# Patient Record
Sex: FEMALE | Race: BLACK OR AFRICAN AMERICAN | NOT HISPANIC OR LATINO
[De-identification: names, ages, dates, MRNs, and addresses within clinical notes are randomized per-mention and may not be internally consistent; named-entity substitution may affect disease eponyms.]

---

## 2020-10-01 ENCOUNTER — APPOINTMENT (OUTPATIENT)
Dept: PLASTIC SURGERY | Facility: CLINIC | Age: 20
End: 2020-10-01
Payer: COMMERCIAL

## 2020-10-01 VITALS — BODY MASS INDEX: 22.68 KG/M2 | WEIGHT: 128 LBS | HEIGHT: 63 IN

## 2020-10-01 DIAGNOSIS — L91.0 HYPERTROPHIC SCAR: ICD-10-CM

## 2020-10-01 PROBLEM — Z00.00 ENCOUNTER FOR PREVENTIVE HEALTH EXAMINATION: Status: ACTIVE | Noted: 2020-10-01

## 2020-10-01 PROCEDURE — 99203 OFFICE O/P NEW LOW 30 MIN: CPT

## 2020-10-08 PROBLEM — L91.0 KELOID: Status: ACTIVE | Noted: 2020-10-08

## 2020-10-08 NOTE — HISTORY OF PRESENT ILLNESS
[FreeTextEntry1] : Patient presents to the office today for Left superior helix keloid. Patient states she got her ear pierced two years ago. About a few weeks later, keloid began to form. Patient here to discuss removal.\par occ itchy\par no relevant pmg/psh\par no fh keloids\par

## 2020-10-20 ENCOUNTER — APPOINTMENT (OUTPATIENT)
Dept: PLASTIC SURGERY | Facility: CLINIC | Age: 20
End: 2020-10-20

## 2023-12-17 ENCOUNTER — INPATIENT (INPATIENT)
Facility: HOSPITAL | Age: 23
LOS: 10 days | Discharge: HOME | End: 2023-12-28
Attending: PSYCHIATRY & NEUROLOGY | Admitting: PSYCHIATRY & NEUROLOGY
Payer: COMMERCIAL

## 2023-12-17 VITALS
RESPIRATION RATE: 16 BRPM | HEART RATE: 135 BPM | DIASTOLIC BLOOD PRESSURE: 103 MMHG | OXYGEN SATURATION: 100 % | TEMPERATURE: 99 F | SYSTOLIC BLOOD PRESSURE: 149 MMHG

## 2023-12-17 DIAGNOSIS — F30.9 MANIC EPISODE, UNSPECIFIED: ICD-10-CM

## 2023-12-17 LAB
ALBUMIN SERPL ELPH-MCNC: 4.3 G/DL — SIGNIFICANT CHANGE UP (ref 3.3–5)
ALBUMIN SERPL ELPH-MCNC: 4.3 G/DL — SIGNIFICANT CHANGE UP (ref 3.3–5)
ALP SERPL-CCNC: 56 U/L — SIGNIFICANT CHANGE UP (ref 40–120)
ALP SERPL-CCNC: 56 U/L — SIGNIFICANT CHANGE UP (ref 40–120)
ALT FLD-CCNC: 14 U/L — SIGNIFICANT CHANGE UP (ref 4–33)
ALT FLD-CCNC: 14 U/L — SIGNIFICANT CHANGE UP (ref 4–33)
ANION GAP SERPL CALC-SCNC: 18 MMOL/L — HIGH (ref 7–14)
ANION GAP SERPL CALC-SCNC: 18 MMOL/L — HIGH (ref 7–14)
APAP SERPL-MCNC: <10 UG/ML — LOW (ref 15–25)
APAP SERPL-MCNC: <10 UG/ML — LOW (ref 15–25)
AST SERPL-CCNC: 31 U/L — SIGNIFICANT CHANGE UP (ref 4–32)
AST SERPL-CCNC: 31 U/L — SIGNIFICANT CHANGE UP (ref 4–32)
BASOPHILS # BLD AUTO: 0.05 K/UL — SIGNIFICANT CHANGE UP (ref 0–0.2)
BASOPHILS # BLD AUTO: 0.05 K/UL — SIGNIFICANT CHANGE UP (ref 0–0.2)
BASOPHILS NFR BLD AUTO: 1 % — SIGNIFICANT CHANGE UP (ref 0–2)
BASOPHILS NFR BLD AUTO: 1 % — SIGNIFICANT CHANGE UP (ref 0–2)
BILIRUB SERPL-MCNC: 0.4 MG/DL — SIGNIFICANT CHANGE UP (ref 0.2–1.2)
BILIRUB SERPL-MCNC: 0.4 MG/DL — SIGNIFICANT CHANGE UP (ref 0.2–1.2)
BUN SERPL-MCNC: 14 MG/DL — SIGNIFICANT CHANGE UP (ref 7–23)
BUN SERPL-MCNC: 14 MG/DL — SIGNIFICANT CHANGE UP (ref 7–23)
CALCIUM SERPL-MCNC: 9.3 MG/DL — SIGNIFICANT CHANGE UP (ref 8.4–10.5)
CALCIUM SERPL-MCNC: 9.3 MG/DL — SIGNIFICANT CHANGE UP (ref 8.4–10.5)
CHLORIDE SERPL-SCNC: 103 MMOL/L — SIGNIFICANT CHANGE UP (ref 98–107)
CHLORIDE SERPL-SCNC: 103 MMOL/L — SIGNIFICANT CHANGE UP (ref 98–107)
CO2 SERPL-SCNC: 18 MMOL/L — LOW (ref 22–31)
CO2 SERPL-SCNC: 18 MMOL/L — LOW (ref 22–31)
CREAT SERPL-MCNC: 0.69 MG/DL — SIGNIFICANT CHANGE UP (ref 0.5–1.3)
CREAT SERPL-MCNC: 0.69 MG/DL — SIGNIFICANT CHANGE UP (ref 0.5–1.3)
EGFR: 125 ML/MIN/1.73M2 — SIGNIFICANT CHANGE UP
EGFR: 125 ML/MIN/1.73M2 — SIGNIFICANT CHANGE UP
EOSINOPHIL # BLD AUTO: 0.04 K/UL — SIGNIFICANT CHANGE UP (ref 0–0.5)
EOSINOPHIL # BLD AUTO: 0.04 K/UL — SIGNIFICANT CHANGE UP (ref 0–0.5)
EOSINOPHIL NFR BLD AUTO: 0.8 % — SIGNIFICANT CHANGE UP (ref 0–6)
EOSINOPHIL NFR BLD AUTO: 0.8 % — SIGNIFICANT CHANGE UP (ref 0–6)
ETHANOL SERPL-MCNC: <10 MG/DL — SIGNIFICANT CHANGE UP
ETHANOL SERPL-MCNC: <10 MG/DL — SIGNIFICANT CHANGE UP
FLUAV AG NPH QL: SIGNIFICANT CHANGE UP
FLUAV AG NPH QL: SIGNIFICANT CHANGE UP
FLUBV AG NPH QL: SIGNIFICANT CHANGE UP
FLUBV AG NPH QL: SIGNIFICANT CHANGE UP
GLUCOSE SERPL-MCNC: 96 MG/DL — SIGNIFICANT CHANGE UP (ref 70–99)
GLUCOSE SERPL-MCNC: 96 MG/DL — SIGNIFICANT CHANGE UP (ref 70–99)
HCG SERPL-ACNC: <1 MIU/ML — SIGNIFICANT CHANGE UP
HCG SERPL-ACNC: <1 MIU/ML — SIGNIFICANT CHANGE UP
HCT VFR BLD CALC: 27.6 % — LOW (ref 34.5–45)
HCT VFR BLD CALC: 27.6 % — LOW (ref 34.5–45)
HGB BLD-MCNC: 8.3 G/DL — LOW (ref 11.5–15.5)
HGB BLD-MCNC: 8.3 G/DL — LOW (ref 11.5–15.5)
IANC: 3.77 K/UL — SIGNIFICANT CHANGE UP (ref 1.8–7.4)
IANC: 3.77 K/UL — SIGNIFICANT CHANGE UP (ref 1.8–7.4)
IMM GRANULOCYTES NFR BLD AUTO: 0.4 % — SIGNIFICANT CHANGE UP (ref 0–0.9)
IMM GRANULOCYTES NFR BLD AUTO: 0.4 % — SIGNIFICANT CHANGE UP (ref 0–0.9)
LYMPHOCYTES # BLD AUTO: 0.89 K/UL — LOW (ref 1–3.3)
LYMPHOCYTES # BLD AUTO: 0.89 K/UL — LOW (ref 1–3.3)
LYMPHOCYTES # BLD AUTO: 17.4 % — SIGNIFICANT CHANGE UP (ref 13–44)
LYMPHOCYTES # BLD AUTO: 17.4 % — SIGNIFICANT CHANGE UP (ref 13–44)
MCHC RBC-ENTMCNC: 22.6 PG — LOW (ref 27–34)
MCHC RBC-ENTMCNC: 22.6 PG — LOW (ref 27–34)
MCHC RBC-ENTMCNC: 30.1 GM/DL — LOW (ref 32–36)
MCHC RBC-ENTMCNC: 30.1 GM/DL — LOW (ref 32–36)
MCV RBC AUTO: 75.2 FL — LOW (ref 80–100)
MCV RBC AUTO: 75.2 FL — LOW (ref 80–100)
MONOCYTES # BLD AUTO: 0.34 K/UL — SIGNIFICANT CHANGE UP (ref 0–0.9)
MONOCYTES # BLD AUTO: 0.34 K/UL — SIGNIFICANT CHANGE UP (ref 0–0.9)
MONOCYTES NFR BLD AUTO: 6.7 % — SIGNIFICANT CHANGE UP (ref 2–14)
MONOCYTES NFR BLD AUTO: 6.7 % — SIGNIFICANT CHANGE UP (ref 2–14)
NEUTROPHILS # BLD AUTO: 3.77 K/UL — SIGNIFICANT CHANGE UP (ref 1.8–7.4)
NEUTROPHILS # BLD AUTO: 3.77 K/UL — SIGNIFICANT CHANGE UP (ref 1.8–7.4)
NEUTROPHILS NFR BLD AUTO: 73.7 % — SIGNIFICANT CHANGE UP (ref 43–77)
NEUTROPHILS NFR BLD AUTO: 73.7 % — SIGNIFICANT CHANGE UP (ref 43–77)
NRBC # BLD: 0 /100 WBCS — SIGNIFICANT CHANGE UP (ref 0–0)
NRBC # BLD: 0 /100 WBCS — SIGNIFICANT CHANGE UP (ref 0–0)
NRBC # FLD: 0 K/UL — SIGNIFICANT CHANGE UP (ref 0–0)
NRBC # FLD: 0 K/UL — SIGNIFICANT CHANGE UP (ref 0–0)
PLATELET # BLD AUTO: 374 K/UL — SIGNIFICANT CHANGE UP (ref 150–400)
PLATELET # BLD AUTO: 374 K/UL — SIGNIFICANT CHANGE UP (ref 150–400)
POTASSIUM SERPL-MCNC: 3.9 MMOL/L — SIGNIFICANT CHANGE UP (ref 3.5–5.3)
POTASSIUM SERPL-MCNC: 3.9 MMOL/L — SIGNIFICANT CHANGE UP (ref 3.5–5.3)
POTASSIUM SERPL-SCNC: 3.9 MMOL/L — SIGNIFICANT CHANGE UP (ref 3.5–5.3)
POTASSIUM SERPL-SCNC: 3.9 MMOL/L — SIGNIFICANT CHANGE UP (ref 3.5–5.3)
PROT SERPL-MCNC: 7.5 G/DL — SIGNIFICANT CHANGE UP (ref 6–8.3)
PROT SERPL-MCNC: 7.5 G/DL — SIGNIFICANT CHANGE UP (ref 6–8.3)
RBC # BLD: 3.67 M/UL — LOW (ref 3.8–5.2)
RBC # BLD: 3.67 M/UL — LOW (ref 3.8–5.2)
RBC # FLD: 18.4 % — HIGH (ref 10.3–14.5)
RBC # FLD: 18.4 % — HIGH (ref 10.3–14.5)
RSV RNA NPH QL NAA+NON-PROBE: SIGNIFICANT CHANGE UP
RSV RNA NPH QL NAA+NON-PROBE: SIGNIFICANT CHANGE UP
SALICYLATES SERPL-MCNC: <0.3 MG/DL — LOW (ref 15–30)
SALICYLATES SERPL-MCNC: <0.3 MG/DL — LOW (ref 15–30)
SARS-COV-2 RNA SPEC QL NAA+PROBE: SIGNIFICANT CHANGE UP
SARS-COV-2 RNA SPEC QL NAA+PROBE: SIGNIFICANT CHANGE UP
SODIUM SERPL-SCNC: 139 MMOL/L — SIGNIFICANT CHANGE UP (ref 135–145)
SODIUM SERPL-SCNC: 139 MMOL/L — SIGNIFICANT CHANGE UP (ref 135–145)
TOXICOLOGY SCREEN, DRUGS OF ABUSE, SERUM RESULT: SIGNIFICANT CHANGE UP
TOXICOLOGY SCREEN, DRUGS OF ABUSE, SERUM RESULT: SIGNIFICANT CHANGE UP
TSH SERPL-MCNC: 1.85 UIU/ML — SIGNIFICANT CHANGE UP (ref 0.27–4.2)
TSH SERPL-MCNC: 1.85 UIU/ML — SIGNIFICANT CHANGE UP (ref 0.27–4.2)
WBC # BLD: 5.11 K/UL — SIGNIFICANT CHANGE UP (ref 3.8–10.5)
WBC # BLD: 5.11 K/UL — SIGNIFICANT CHANGE UP (ref 3.8–10.5)
WBC # FLD AUTO: 5.11 K/UL — SIGNIFICANT CHANGE UP (ref 3.8–10.5)
WBC # FLD AUTO: 5.11 K/UL — SIGNIFICANT CHANGE UP (ref 3.8–10.5)

## 2023-12-17 PROCEDURE — 70450 CT HEAD/BRAIN W/O DYE: CPT | Mod: 26,MA

## 2023-12-17 PROCEDURE — 99285 EMERGENCY DEPT VISIT HI MDM: CPT

## 2023-12-17 RX ORDER — SODIUM CHLORIDE 9 MG/ML
1000 INJECTION INTRAMUSCULAR; INTRAVENOUS; SUBCUTANEOUS ONCE
Refills: 0 | Status: COMPLETED | OUTPATIENT
Start: 2023-12-17 | End: 2023-12-17

## 2023-12-17 RX ORDER — ARIPIPRAZOLE 15 MG/1
5 TABLET ORAL DAILY
Refills: 0 | Status: DISCONTINUED | OUTPATIENT
Start: 2023-12-18 | End: 2023-12-28

## 2023-12-17 RX ORDER — HALOPERIDOL DECANOATE 100 MG/ML
5 INJECTION INTRAMUSCULAR EVERY 6 HOURS
Refills: 0 | Status: DISCONTINUED | OUTPATIENT
Start: 2023-12-18 | End: 2023-12-28

## 2023-12-17 RX ORDER — MIDAZOLAM HYDROCHLORIDE 1 MG/ML
1 INJECTION, SOLUTION INTRAMUSCULAR; INTRAVENOUS ONCE
Refills: 0 | Status: DISCONTINUED | OUTPATIENT
Start: 2023-12-17 | End: 2023-12-17

## 2023-12-17 RX ORDER — DIPHENHYDRAMINE HCL 50 MG
50 CAPSULE ORAL EVERY 6 HOURS
Refills: 0 | Status: DISCONTINUED | OUTPATIENT
Start: 2023-12-18 | End: 2023-12-28

## 2023-12-17 RX ORDER — HALOPERIDOL DECANOATE 100 MG/ML
5 INJECTION INTRAMUSCULAR ONCE
Refills: 0 | Status: COMPLETED | OUTPATIENT
Start: 2023-12-17 | End: 2023-12-17

## 2023-12-17 RX ORDER — HALOPERIDOL DECANOATE 100 MG/ML
2 INJECTION INTRAMUSCULAR ONCE
Refills: 0 | Status: DISCONTINUED | OUTPATIENT
Start: 2023-12-17 | End: 2023-12-17

## 2023-12-17 RX ORDER — FERROUS SULFATE 325(65) MG
325 TABLET ORAL DAILY
Refills: 0 | Status: DISCONTINUED | OUTPATIENT
Start: 2023-12-17 | End: 2023-12-18

## 2023-12-17 RX ORDER — DIPHENHYDRAMINE HCL 50 MG
50 CAPSULE ORAL ONCE
Refills: 0 | Status: DISCONTINUED | OUTPATIENT
Start: 2023-12-18 | End: 2023-12-28

## 2023-12-17 RX ORDER — HALOPERIDOL DECANOATE 100 MG/ML
5 INJECTION INTRAMUSCULAR ONCE
Refills: 0 | Status: DISCONTINUED | OUTPATIENT
Start: 2023-12-18 | End: 2023-12-28

## 2023-12-17 RX ORDER — MIDAZOLAM HYDROCHLORIDE 1 MG/ML
5 INJECTION, SOLUTION INTRAMUSCULAR; INTRAVENOUS ONCE
Refills: 0 | Status: DISCONTINUED | OUTPATIENT
Start: 2023-12-17 | End: 2023-12-17

## 2023-12-17 RX ORDER — ARIPIPRAZOLE 15 MG/1
5 TABLET ORAL ONCE
Refills: 0 | Status: COMPLETED | OUTPATIENT
Start: 2023-12-17 | End: 2023-12-17

## 2023-12-17 RX ADMIN — SODIUM CHLORIDE 1000 MILLILITER(S): 9 INJECTION INTRAMUSCULAR; INTRAVENOUS; SUBCUTANEOUS at 17:37

## 2023-12-17 RX ADMIN — MIDAZOLAM HYDROCHLORIDE 1 MILLIGRAM(S): 1 INJECTION, SOLUTION INTRAMUSCULAR; INTRAVENOUS at 17:40

## 2023-12-17 RX ADMIN — MIDAZOLAM HYDROCHLORIDE 5 MILLIGRAM(S): 1 INJECTION, SOLUTION INTRAMUSCULAR; INTRAVENOUS at 16:36

## 2023-12-17 RX ADMIN — HALOPERIDOL DECANOATE 5 MILLIGRAM(S): 100 INJECTION INTRAMUSCULAR at 16:36

## 2023-12-17 NOTE — ED PROVIDER NOTE - PHYSICAL EXAMINATION
GENERAL: laying with eyes closed, chanting the phrase "I bind and blind all unclean spirits in the name of alphonso. thank you abblauren" on repeat.   HEAD: normocephalic, atraumatic  HEENT: normal conjunctiva, oral mucosa moist,  CARDIAC: tachycardic, 2+ pulses in UE/LE b/l  PULM: normal breath sounds, clear to ascultation bilaterally, no rales, rhonchi, wheezing no resp distress GENERAL: in distress, laying with eyes closed, chanting the phrase "I bind and blind all unclean spirits in the name of alphonso. thank you minora" on repeat.   HEAD: normocephalic, atraumatic  HEENT: normal conjunctiva, oral mucosa moist,  CARDIAC: tachycardic, 2+ pulses in UE/LE b/l  PULM: normal breath sounds, clear to ascultation bilaterally, no rales, rhonchi, wheezing no resp distress  GI: abdomen nondistended, soft, nontender, no guarding, rebound tenderness  NEURO: normal speech, pupils pinpoint but reactive, EOMI, , AAOx1   MSK: pelvis stable, chest wall stable, no evidence of trauma to the extremities, no visible deformities, moves all extremities without issue.   SKIN: well-perfused, extremities warm, no visible ecchymosis or abrasions to her chest, abdomen, and extremities   PSYCH: does not display insight into her current condition and reason for being in the hospital, does not appear to be responding to internal stimuli,

## 2023-12-17 NOTE — ED BEHAVIORAL HEALTH ASSESSMENT NOTE - HPI (INCLUDE ILLNESS QUALITY, SEVERITY, DURATION, TIMING, CONTEXT, MODIFYING FACTORS, ASSOCIATED SIGNS AND SYMPTOMS)
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    Pt seen with boyfriend. Pt is a limited historian. Pt says she is not sure why she is here, "ask my aunt." Pt makes comments that make limited sense when asked about what events led her to the hospital, speaking about differences in people and being "authentic self." Pt says that she has been sleeping well, has been "working on herself." Pt endorses good appetite, good sleep, good mood, denies SI/HI/AVH. Denies all substance use. Falls asleep during interview, asks writer to leave.    Writer interviews pt's boyfriend outside of the room, provides most of the history. States that the patient was her normal self until this last weekend when she started feeling like her "hips were out of whack." Went to the chiropractor this weekend, has been hyperfixated on hips and walking since then. Says that starting on Sunday, pt had started to have moods that ranged from "hyper and too up" to irritable. Says that she did not sleep Sunday through Wednesday, went to the ED at an outside hospital on Wednesday where she received Xanax and slept that night. Did not sleep the nights since then for more than 1-2 hours each night, has had more energy than normal, was noted to speak more rapidly than normal, difficult but possible to interrupt. 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 Has been saying this repeatedly throughout the week. Pt noted to not be Latter day normally but has been hyper-Latter day this week. Also pt noted to have increase in goal-directed behaviors such as cleaning, noted to be cleaning excessively and difficult to get to stop cleaning. Pt also doing behaviors that do not make sense like running out of car to "go to [employer]" then running behind the store and come back and say that she went into the store to buy something when she did not. Noted to be mumbling to self as well so boyfriend unsure if she is responding to voices. Today aunt called police on patient due to her behaviors, noted that she ran away from the police yelling "I bind you and blind you." Per boyfriend, pt never had any other symptoms such as this before. Denies any known periods of time where she appeared sad or down. No substance use noted with patient. Pt has anemia per boyfriend but not on any other medications. 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    Pt seen with boyfriend. Pt is a limited historian. Pt says she is not sure why she is here, "ask my aunt." Pt makes comments that make limited sense when asked about what events led her to the hospital, speaking about differences in people and being "authentic self." Pt says that she has been sleeping well, has been "working on herself." Pt endorses good appetite, good sleep, good mood, denies SI/HI/AVH. Denies all substance use. Falls asleep during interview, asks writer to leave.    Writer interviews pt's boyfriend outside of the room, provides most of the history. States that the patient was her normal self until this last weekend when she started feeling like her "hips were out of whack." Went to the chiropractor this weekend, has been hyperfixated on hips and walking since then. Says that starting on Sunday, pt had started to have moods that ranged from "hyper and too up" to irritable. Says that she did not sleep Sunday through Wednesday, went to the ED at an outside hospital on Wednesday where she received Xanax and slept that night. Did not sleep the nights since then for more than 1-2 hours each night, has had more energy than normal, was noted to speak more rapidly than normal, difficult but possible to interrupt. 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 Has been saying this repeatedly throughout the week. Pt noted to not be Yarsanism normally but has been hyper-Yarsanism this week. Also pt noted to have increase in goal-directed behaviors such as cleaning, noted to be cleaning excessively and difficult to get to stop cleaning. Pt also doing behaviors that do not make sense like running out of car to "go to [employer]" then running behind the store and come back and say that she went into the store to buy something when she did not. Noted to be mumbling to self as well so boyfriend unsure if she is responding to voices. Today aunt called police on patient due to her behaviors, noted that she ran away from the police yelling "I bind you and blind you." Per boyfriend, pt never had any other symptoms such as this before. Denies any known periods of time where she appeared sad or down. No substance use noted with patient. Pt has anemia per boyfriend but not on any other medications.

## 2023-12-17 NOTE — ED ADULT NURSE REASSESSMENT NOTE - NS ED NURSE REASSESS COMMENT FT1
Break RN: Pt uncooperative with care. Thrashing in stretcher. Medicated as per EMR orders. EKG performed. Pt placed on cardiac monitor. Normal sinus. Family remains at bedside. No acute distress noted. Safety maintained.

## 2023-12-17 NOTE — ED ADULT NURSE NOTE - NS ED NURSE AMBULANCES2
[facility]

## 2023-12-17 NOTE — ED ADULT NURSE NOTE - ED STAT RN HANDOFF DETAILS
Patient picked up by EMS with no personal belongings. Mother has them. Called mom to let her know where and that patient left.   VERENA Bean

## 2023-12-17 NOTE — ED ADULT NURSE NOTE - OBJECTIVE STATEMENT
Pt brought in for AMS. Pt repeatedly using same phases over and over. Pt is unable to answer any questions regarding her condition. Family members accompanying pt.

## 2023-12-17 NOTE — ED BEHAVIORAL HEALTH ASSESSMENT NOTE - NSBHATTESTCOMMENTATTENDFT_PSY_A_CORE
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  On assessment pt is drowsy  , but periodically wspeak and smiles and times laughs inappropriately. Patient is not able to contribute much to the history but as per the family including pt's mom , pt has not been sleeping for almost a week, fixated somatically, , with elevated mood, hyperreligious , exhibiting erratic behavior. Pt presents manic with significant affective dysregulation requiring involuntary admission for safety and stabilization. 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  On assessment pt is drowsy  , but periodically wspeak and smiles and times laughs inappropriately. Patient is not able to contribute much to the history but as per the family including pt's mom , pt has not been sleeping for almost a week, fixated somatically, , with elevated mood, hyperreligious , exhibiting erratic behavior. Pt presents manic with significant affective dysregulation requiring involuntary admission for safety and stabilization.

## 2023-12-17 NOTE — ED BEHAVIORAL HEALTH ASSESSMENT NOTE - SUMMARY
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    Pt presents with symptoms of edda including intermittently elevated than irritable mood, grandiosity, decreased need for sleep, increased energy, increased goal-directed behavior, erratic behaviors, along with Scientologist preoccupations and some noted responding to internal stimuli from family members. This is the patient's first manic episode per collateral. The patient does not use substances so this does not appear to be substance related. Pt requires inpatient psychiatric admission for acute stabilization and to minimize risk to self and others, will sign a 9.27 for IP admission.    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    Pt presents with symptoms of edda including intermittently elevated than irritable mood, grandiosity, decreased need for sleep, increased energy, increased goal-directed behavior, erratic behaviors, along with Yarsani preoccupations and some noted responding to internal stimuli from family members. This is the patient's first manic episode per collateral. The patient does not use substances so this does not appear to be substance related. Pt requires inpatient psychiatric admission for acute stabilization and to minimize risk to self and others, will sign a 9.27 for IP admission.    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    Pt presents with symptoms of edda including intermittently elevated than irritable mood, grandiosity, decreased need for sleep, increased energy, increased goal-directed behavior, erratic behaviors, along with Gnosticism preoccupations and some noted responding to internal stimuli from family members. This is the patient's first manic episode per collateral. The patient does not use substances so this does not appear to be substance related. Pt requires inpatient psychiatric admission for acute stabilization and to minimize risk to self and others, will sign a 9.27 for IP admission.    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    Pt presents with symptoms of edda including intermittently elevated than irritable mood, grandiosity, decreased need for sleep, increased energy, increased goal-directed behavior, erratic behaviors, along with Protestant preoccupations and some noted responding to internal stimuli from family members. This is the patient's first manic episode per collateral. The patient does not use substances so this does not appear to be substance related. Pt requires inpatient psychiatric admission for acute stabilization and to minimize risk to self and others, will sign a 9.27 for IP admission.    Plan  -admit to IP on 9.27  -start Abilify 5mg daily for edda  -Haldol 5mg/Ativan 2mg/Benadryl 50mg [id number] PRN PO/IV/IM for agitation  -iron supplementation for anemia

## 2023-12-17 NOTE — ED BEHAVIORAL HEALTH ASSESSMENT NOTE - DESCRIPTION
Pt noted to need agitated when entering hospital, requiring Versed 5mg and Haldol 5mg IM. Afterwards, pt noted to be resting comfortably in the bed.    ICU Vital Signs Last 24 Hrs  T(C): 36.6 (17 Dec 2023 17:49), Max: 37.1 (17 Dec 2023 15:35)  T(F): 97.8 (17 Dec 2023 17:49), Max: 98.8 (17 Dec 2023 15:35)  HR: 90 (17 Dec 2023 17:49) (90 - 135)  BP: 102/70 (17 Dec 2023 17:49) (102/70 - 149/103)  BP(mean): --  ABP: --  ABP(mean): --  RR: 16 (17 Dec 2023 17:49) (15 - 16)  SpO2: 100% (17 Dec 2023 17:49) (99% - 100%)    O2 Parameters below as of 17 Dec 2023 17:49  Patient On (Oxygen Delivery Method): room air anemia lives with family, works as , has boyfriend

## 2023-12-17 NOTE — ED PROVIDER NOTE - OBJECTIVE STATEMENT
23-year-old female past medical history of anemia presenting for evaluation of psychosis onset Friday associated with insomnia onset 1 week ago.  Mother at bedside provides history, states that 1 week ago the patient requested to see a chiropractor for vague bodily symptoms such as head pressure and "body shifting".  Patient was evaluated at [facility] ED twice for similar complaints in the last week.  Workup was unrevealing at the time.  Mother reports the patient has not slept more than 1 hour per night for the last week. 4 days ago the mother noticed that the patient had stopped eating, 3 days ago the patient began displaying abnormal behavior, making strange statements and not acting herself. it progressively worsened until today where the pt was visiting her cousin and was displaying bizzare behavior. she began running away from her family thinking cars were chasing her. she was found repeating the phrase "I bind and blind all unclean spirits in the name of alphonso. thank you abba" nonstop and was not responding to commands.     Family report no known psychiatric disease in the patient, no prior hospitalizations for psychiatric complaints or medical complaints, no recent fever, no known drug use, no recent travel, no medication use.  Patient's father has bipolar disease.      Mother: Gilberto Lind [phone]   Boyfriend Agustin [phone] 23-year-old female past medical history of anemia presenting for evaluation of psychosis onset Friday associated with insomnia onset 1 week ago.  Mother at bedside provides history, states that 1 week ago the patient requested to see a chiropractor for vague bodily symptoms such as head pressure and "body shifting".  Patient was evaluated at [facility] ED twice for similar complaints in the last week.  Workup was unrevealing at the time.  Mother reports the patient has not slept more than 1 hour per night for the last week. 4 days ago the mother noticed that the patient had stopped eating, 3 days ago the patient began displaying abnormal behavior, making strange statements and not acting herself. it progressively worsened until today where the pt was visiting her cousin and was displaying bizzare behavior. she began running away from her family thinking cars were chasing her. she was found repeating the phrase "I bind and blind all unclean spirits in the name of alphonso. thank you abba" nonstop and was not responding to commands.     Family report no known psychiatric disease in the patient, no prior hospitalizations for psychiatric complaints or medical complaints, no recent fever, no known drug use, no recent travel, no medication use.  Patient's father has bipolar disease.      Mother: Gilberto Lind [phone]   Boyfriend Agustin [phone]

## 2023-12-17 NOTE — ED PROVIDER NOTE - CLINICAL SUMMARY MEDICAL DECISION MAKING FREE TEXT BOX
23-year-old female no known psychiatric disease, no prior psychiatric hospitalizations, past medical history of anemia presenting for evaluation of insomnia x 1 week, poor p.o. intake x 4 days, and altered mental status/psychosis onset 3 days which acutely worsened today.  Patient is in distress, tachycardic but otherwise vitals are hemodynamically stable.  No known triggers or inciting events.  Patient does have a father who has bipolar disease.  No known substance use, recent travel, illnesses or exposures.    Differential diagnosis includes was not limited to acute psychosis secondary to metabolic cause versus new onset psychiatric disease versus infectious etiology versus intracranial process.  Plan to get CT head, psych clearance labs, EKG. patient required chemical restraints with Haldol and Versed for patient's safety, to facilitate safe medical evaluation, and for staff safety.  Plan to consult psychiatry.  Anticipate that patient will require inpatient admission    Mother: Gilberto Lind [phone]   Boyfriend Galen [phone] 23-year-old female no known psychiatric disease, no prior psychiatric hospitalizations, past medical history of anemia presenting for evaluation of insomnia x 1 week, poor p.o. intake x 4 days, and altered mental status/psychosis onset 3 days which acutely worsened today.  Patient is in distress, tachycardic but otherwise vitals are hemodynamically stable.  No known triggers or inciting events.  Patient does have a father who has bipolar disease.  No known substance use, recent travel, illnesses or exposures.    Differential diagnosis includes was not limited to acute psychosis secondary to metabolic cause versus new onset psychiatric disease versus infectious etiology versus intracranial process.  Plan to get CT head, psych clearance labs, EKG. patient required chemical restraints with Haldol and Versed for patient's safety, to facilitate safe medical evaluation, and for staff safety.  Plan to consult psychiatry.  Anticipate that patient will require inpatient admission    Mother: Gilberto Lind [phone]   Boyfriend Galen [phone]

## 2023-12-17 NOTE — ED ADULT TRIAGE NOTE - CHIEF COMPLAINT QUOTE
brought in by EMS from home for AMS. As per EMS, pt's aunt called EMS stating pt was suddenly aggressive and then started speaking the way she is. Pt is speaking loudly stating repetitively ,"I bind and blind you but the power of Rosalino". Dr. florence came to eval pt. Pt to be evaluated in main ED.

## 2023-12-17 NOTE — ED BEHAVIORAL HEALTH ASSESSMENT NOTE - OTHER
Well-Child Checkup: 15 to 18 Years    During the teen years, it’s important to keep having yearly checkups. Your teen may be embarrassed about having a checkup. Reassure your teen that the exam is normal and necessary.  Be aware that the healthcare provid · Body changes. The body grows and matures during puberty. Hair will grow in the pubic area and on other parts of the body. Girls grow breasts and menstruate (have monthly periods). A boy’s voice changes, becoming lower and deeper.  As the penis matures, er boyfriend · Eat healthy. Your child should eat fruits, vegetables, lean meats, and whole grains every day. Less healthy foods—like Western Swati fries, candy, and chips—should be eaten rarely.  Some teens fall into the trap of snacking on junk food and fast food throughout · Help your teen wake up, if needed. Go into the bedroom, open the blinds, and get your teen out of bed — even on weekends or during school vacations. · Being active during the day will help your child sleep better at night.   · Discourage use of the TV, c · Teach your child to make good decisions about drugs, alcohol, sex, and other risky behaviors.  Work together to come up with strategies for staying safe and dealing with peer pressure. Make sure your teenager knows he or she can always come to you for hel Stay involved in your teen’s life. Make sure your teen knows you’re always there when he or she needs to talk. During the teen years, it’s important to keep having yearly checkups. Your teen may be embarrassed about having a checkup.  Reassure your teen family member pending bed placement

## 2023-12-17 NOTE — ED PROVIDER NOTE - PROGRESS NOTE DETAILS
Kathia Garcia MD, PGY2: attempted verbal redirection, pt did not respond or acknowledge verbal commands, pt became agitated upon physical stimuli and in response to attempted physical exam. security called to help facilitate administration of  Versed 5mg and Haldol 5mg IM for anxiolysis, pt safety,  staff safety and to enable safe medical evaluation. NICOLETTE RUST:  aware - they will evaluate patient. DO Carlyle (PGY-3): Psychiatry recommending emergency department boarding while waiting for psychiatry bed. Psychiatry also recommending Abilify 5 mg. Patient has history of anemia will begin iron supplementation. YANY: I was signed out this pt pending admission to [place] for her new onset psychiatric condition. Pt was to be brought back to  from main ED for boarding but pt is refusing to have IV taken out by staff and is becoming agitated. Will require chemical sedation with 2 mg IVP Ativan for staff and pt safety. Will monitor closely in  until transfer to [place]. Labs shows anemia which is likely baseline, no previous to compare but no recent reports of acute blood loss. CT head unremarkable per rads. Pt medically cleared. YANY: I was signed out this pt pending admission to [facility] for her new onset psychiatric condition. Pt was to be brought back to  from main ED for boarding but pt is refusing to have IV taken out by staff and is becoming agitated. Will require chemical sedation with 2 mg IVP Ativan for staff and pt safety. Will monitor closely in  until transfer to [facility]. Labs shows anemia which is likely baseline, no previous to compare but no recent reports of acute blood loss. CT head unremarkable per rads. Pt medically cleared.

## 2023-12-17 NOTE — ED ADULT NURSE NOTE - NSFALLUNIVINTERV_ED_ALL_ED
Bed/Stretcher in lowest position, wheels locked, appropriate side rails in place/Call bell, personal items and telephone in reach/Instruct patient to call for assistance before getting out of bed/chair/stretcher/Non-slip footwear applied when patient is off stretcher/[place] to call system/Physically safe environment - no spills, clutter or unnecessary equipment/Purposeful proactive rounding/Room/bathroom lighting operational, light cord in reach Bed/Stretcher in lowest position, wheels locked, appropriate side rails in place/Call bell, personal items and telephone in reach/Instruct patient to call for assistance before getting out of bed/chair/stretcher/Non-slip footwear applied when patient is off stretcher/[place] to call system/Physically safe environment - no spills, clutter or unnecessary equipment/Purposeful proactive rounding/Room/bathroom lighting operational, light cord in reach

## 2023-12-17 NOTE — ED BEHAVIORAL HEALTH ASSESSMENT NOTE - RISK ASSESSMENT
Risk factors: edda, erratic behaviors, not receiving treatment, unable to care for self 2/2 psychiatric illness    Protective factors: no current SIIP/HIIP, no h/o SA/SIB, no h/o psych admissions, no access to weapons, no active substance abuse, good physical health, engaged in work, domiciled, social supports,     Overall, pt is a high risk of harm to self/others and requires psychiatric admission.

## 2023-12-17 NOTE — ED PROVIDER NOTE - ATTENDING CONTRIBUTION TO CARE
Dr. Walker, Attending Physician-  I performed a face to face bedside interview with patient regarding history of present illness, review of symptoms and past medical history. I completed an independent physical exam.  I have discussed patient's plan of care with the resident.    23F, anemia, who presents with change in behavior. Brought in by mother and family member who is an nurse here at [facility]. For the past one week, mother notes significant insomnia. Then for the past few days, has not been acting herself and has been wandering the streets. For the past 24 hours, patient has repeatedly "I bind and blind all unclean spirits in the name of alphonso. thank you abba." Father has hx of bipolar. No prior psych hx. No meds at baseline. Per mother, does not have a hx of substance use. Physical: repeatedly chanting "I bind and blind all unclean spirits in the name of alphonso. thank you abba.", tachycardic, eyes closed, ctabl, abdomen soft, taylor spontaneously. Plan: needed chemical sedation to facilitate medical work up - labs, CTH. Dispo pending. Dr. Walker, Attending Physician-  I performed a face to face bedside interview with patient regarding history of present illness, review of symptoms and past medical history. I completed an independent physical exam.  I have discussed patient's plan of care with the resident.    23F, anemia, who presents with change in behavior. Brought in by mother and family member who is an nurse here at [facility]. For the past one week, mother notes significant insomnia. Then for the past few days, has not been acting herself and has been wandering the streets. For the past 24 hours, patient has repeatedly "I bind and blind all unclean spirits in the name of alphonso. thank you abba." Father has hx of bipolar. No prior psych hx. No meds at baseline. Per mother, does not have a hx of substance use. Physical: repeatedly chanting "I bind and blind all unclean spirits in the name of alphonso. thank you abba.", tachycardic, eyes closed, ctabl, abdomen soft, taylor spontaneously. Plan: needed chemical sedation to facilitate medical work up - labs, CTH. Dispo pending.

## 2023-12-18 DIAGNOSIS — F29 UNSPECIFIED PSYCHOSIS NOT DUE TO A SUBSTANCE OR KNOWN PHYSIOLOGICAL CONDITION: ICD-10-CM

## 2023-12-18 PROCEDURE — 99222 1ST HOSP IP/OBS MODERATE 55: CPT | Mod: GC

## 2023-12-18 RX ORDER — FERROUS SULFATE 325(65) MG
325 TABLET ORAL DAILY
Refills: 0 | Status: DISCONTINUED | OUTPATIENT
Start: 2023-12-18 | End: 2023-12-28

## 2023-12-18 RX ADMIN — Medication 2 MILLIGRAM(S): at 00:29

## 2023-12-18 NOTE — BH INPATIENT PSYCHIATRY ASSESSMENT NOTE - NSSUICPROTFACT_PSY_ALL_CORE
Responsibility to children, family, or others/Identifies reasons for living/Supportive social network of family or friends/Gnosticist beliefs Responsibility to children, family, or others/Identifies reasons for living/Supportive social network of family or friends/Orthodox beliefs

## 2023-12-18 NOTE — ED BEHAVIORAL HEALTH PROGRESS NOTE - PSYCHIATRIC ISSUES AND PLAN (INCLUDE STANDING AND PRN MEDICATION)
-start Abilify 5mg daily for psychosis/mood symptoms r/o edda  -Haldol 5mg/Ativan 2mg/Benadryl 50mg [id number] PRN PO/IV/IM for agitation -start Abilify 5mg daily for psychosis/mood symptoms r/o edda  -Haldol 5mg/Ativan 2mg/Benadryl 50mg [id number] PRN PO/IV/IM for agitation

## 2023-12-18 NOTE — BH PATIENT PROFILE - FUNCTIONAL ASSESSMENT - BASIC MOBILITY 6.
4-calculated by average/Not able to assess (calculate score using [facility] averaging method)  4-calculated by average/Not able to assess (calculate score using [facility] averaging method)

## 2023-12-18 NOTE — BH INPATIENT PSYCHIATRY ASSESSMENT NOTE - CURRENT MEDICATION
MEDICATIONS  (STANDING):  ARIPiprazole 5 milliGRAM(s) Oral daily  ferrous    sulfate 325 milliGRAM(s) Oral daily  LORazepam   Injectable 2 milliGRAM(s) IntraMuscular Once    MEDICATIONS  (PRN):  diphenhydrAMINE 50 milliGRAM(s) Oral every 6 hours PRN agitation/eps prophylaxis  diphenhydrAMINE Injectable 50 milliGRAM(s) IntraMuscular once PRN severe agitation/eps prophylaxis  haloperidol     Tablet 5 milliGRAM(s) Oral every 6 hours PRN agitation  haloperidol    Injectable 5 milliGRAM(s) IntraMuscular once PRN Agitation  LORazepam     Tablet 2 milliGRAM(s) Oral every 6 hours PRN Agitation

## 2023-12-18 NOTE — BH INPATIENT PSYCHIATRY ASSESSMENT NOTE - NSBHCHARTREVIEWVS_PSY_A_CORE FT
Vital Signs Last 24 Hrs  T(C): 37 (12-18-23 @ 14:01), Max: 37.2 (12-18-23 @ 00:48)  T(F): 98.6 (12-18-23 @ 14:01), Max: 98.9 (12-18-23 @ 00:48)  HR: 93 (12-18-23 @ 07:56) (90 - 93)  BP: 115/73 (12-18-23 @ 07:56) (102/70 - 116/61)  BP(mean): --  RR: 20 (12-18-23 @ 14:01) (15 - 20)  SpO2: 100% (12-18-23 @ 07:56) (99% - 100%)    Orthostatic VS  12-18-23 @ 14:01  Lying BP: --/-- HR: --  Sitting BP: 138/81 HR: 116  Standing BP: 143/96 HR: 124  Site: --  Mode: --   Vital Signs Last 24 Hrs  T(C): 37 (12-18-23 @ 14:01), Max: 37.2 (12-18-23 @ 00:48)  T(F): 98.6 (12-18-23 @ 14:01), Max: 98.9 (12-18-23 @ 00:48)  HR: 93 (12-18-23 @ 07:56) (90 - 93)  BP: 115/73 (12-18-23 @ 07:56) (102/70 - 116/61)  BP(mean): --  RR: 20 (12-18-23 @ 14:01) (16 - 20)  SpO2: 100% (12-18-23 @ 07:56) (99% - 100%)    Orthostatic VS  12-18-23 @ 14:01  Lying BP: --/-- HR: --  Sitting BP: 138/81 HR: 116  Standing BP: 143/96 HR: 124  Site: --  Mode: --   Vital Signs Last 24 Hrs  T(C): 37 (12-18-23 @ 14:01), Max: 37.2 (12-18-23 @ 00:48)  T(F): 98.6 (12-18-23 @ 14:01), Max: 98.9 (12-18-23 @ 00:48)  HR: 93 (12-18-23 @ 07:56) (93 - 93)  BP: 115/73 (12-18-23 @ 07:56) (115/73 - 116/61)  BP(mean): --  RR: 20 (12-18-23 @ 14:01) (16 - 20)  SpO2: 100% (12-18-23 @ 07:56) (99% - 100%)    Orthostatic VS  12-18-23 @ 14:01  Lying BP: --/-- HR: --  Sitting BP: 138/81 HR: 116  Standing BP: 143/96 HR: 124  Site: --  Mode: --

## 2023-12-18 NOTE — ED BEHAVIORAL HEALTH PROGRESS NOTE - NSBHMSEPERCEPT_PSY_A_CORE
no abnormalities noted on exam but bf notes that she appears to be responding to internal stimuli sometimes/No abnormalities

## 2023-12-18 NOTE — ED ADULT NURSE REASSESSMENT NOTE - NS ED NURSE REASSESS COMMENT FT1
Break RN: Received report from VERENA Bright. Received pt to , calm and cooperative at this time. Pt changed into  gown and pants. Belongings sent home with mother as per VERENA Bright. Respirations even and unlabored, chest rise equal b/l. VS as noted in flow sheets. Emotional support provided. CO order to be discontinued by Dr. Del Rosario. No acute distress noted. Safety maintained throughout. Break RN: Received report from VERENA Self. Received pt to , calm and cooperative at this time. Pt changed into  gown and pants. Belongings sent home with mother as per VERENA Self. Respirations even and unlabored, chest rise equal b/l. VS as noted in flow sheets. Emotional support provided. CO order to be discontinued by Dr. Del Rosario. No acute distress noted. Safety maintained throughout. Break RN: Received report from VERENA Self. Belongings sent home with mother as per VERENA Self. Received pt to  on stretcher. Pt is calm and cooperative at this time. Pt changed into  gown and pants. Pt able to ambulate to  bed. Respirations even and unlabored, chest rise equal b/l. VS as noted in flow sheets. Emotional support provided. CO order to be discontinued by Dr. Del Rosario. No acute distress noted. Safety maintained throughout.

## 2023-12-18 NOTE — BH INPATIENT PSYCHIATRY ASSESSMENT NOTE - DESCRIPTION
Lives with family (mother, sister, GM) in a two story house. Works as [employer] and was employed at [employer] store until recently. Finished 1/2 of bachelors work at [employer], but had to take a leave of absence for financial reasons. Has boyfriend who she reports healthy relationship.  Lives with family (mother, sister, GM) in a two story house. Works as [employer] and was employed at [employer] store until recently. Finished 1/2 of bachelors work at [employer], but had to take a leave of absence for financial reasons. Has boyfriend who she reports healthy relationship.

## 2023-12-18 NOTE — ED ADULT NURSE REASSESSMENT NOTE - NS ED NURSE REASSESS COMMENT FT1
Pt received on stretcher sleeping in bed, mother is at bedside, pt approached and was combative not allowing staff to remove her IV. Pt required verbal redirection, after unsuccessful pt was medicated with ativan 2mg IV pushed and then IV was removed. All belongings sent home with pt's mother.

## 2023-12-18 NOTE — ED BEHAVIORAL HEALTH PROGRESS NOTE - BILLING CODES
25816-[id number] hospital care - low complexity 20-29 minutes [id number]-Ahkxbfzlav hospital care - low complexity 20-29 minutes

## 2023-12-18 NOTE — BH INPATIENT PSYCHIATRY ASSESSMENT NOTE - NSBHATTESTCOMMENTATTENDFT_PSY_A_CORE
Patient seen today for first assessment by primary 1S team. Briefly, Dee Handy is a 22 yo F, domiciled with family, working in /as a nanny, with no prior PPHx including dx, hospitalizations, treatment, no h/o SI or SA, no h/o violence, no h/o substance use, with PMHx anemia and uterine fibroid, BIBEMS activated by family for bizarre change in behavior x several days.     On interview pt presents with rapid speech and disorganized TP at times, but is otherwise calm and cooperative. States that approx 1 week PTA she began to feel physically unwell, and went for several ED evals for bone/joint pain. All w/u were negative. Then a few days PTA she quickly started to feel better, like her body was healing itself. Mood was happy but stable, energy increased but within normal, sleep scattered, and increased interest in Sabianism. Denies recent irritability, distractibility, had been cleaning her room and taking javid out. Denies AVH, paranoia, other delusions, substance use, SI/HI.     Collateral from family obtained in ED c/f manic symptoms. DDx bipolar I disorder, ce manic vs edda 2/2 [facility].    Continue to offer abilify trial, re-order utox, expand collateral. Patient seen today for first assessment by primary 1S team. 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     On interview pt presents with rapid speech and disorganized TP at times, but is otherwise calm and cooperative. States that approx 1 week PTA she began to feel physically unwell, and went for several ED evals for bone/joint pain. All w/u were negative. Then a few days PTA she quickly started to feel better, like her body was healing itself. Mood was happy but stable, energy increased but within normal, sleep scattered, and increased interest in Christian. Denies recent irritability, distractibility, had been cleaning her room and taking javid out. Denies AVH, paranoia, other delusions, substance use, SI/HI.     Collateral from family obtained in ED c/f manic symptoms. DDx bipolar I disorder, ce manic vs edda 2/2 [facility].    Continue to offer abilify trial, re-order utox, expand collateral.

## 2023-12-18 NOTE — ED BEHAVIORAL HEALTH PROGRESS NOTE - OTHER
impaired by history  Pt is at elevated risk for inadvertent injury to self/others due to exacerbation and intensity of symptoms and will therefore require admission to inpatient psychiatry at this time.

## 2023-12-18 NOTE — ED BEHAVIORAL HEALTH PROGRESS NOTE - CASE SUMMARY/FORMULATION (CLEARLY DOCUMENT RATIONALE FOR DISPOSITION CHANGE)
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    Pt noted upon arrival to present  with symptoms of edda including intermittently elevated than irritable mood, grandiosity, decreased need for sleep, increased energy, increased goal-directed behavior, erratic behaviors, along with Caodaism preoccupations and some noted responding to internal stimuli from family members. The patient does not use substances so this does not appear to be substance related.    On re-evaluation patient appears paranoid regarding family members/BF.  Pt requires inpatient psychiatric admission for acute stabilization and to minimize risk to self and others, will sign a 9.27 for IP admission.    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    Pt noted upon arrival to present  with symptoms of edda including intermittently elevated than irritable mood, grandiosity, decreased need for sleep, increased energy, increased goal-directed behavior, erratic behaviors, along with Rastafarian preoccupations and some noted responding to internal stimuli from family members. The patient does not use substances so this does not appear to be substance related.    On re-evaluation patient appears paranoid regarding family members/BF.  Pt requires inpatient psychiatric admission for acute stabilization and to minimize risk to self and others, will sign a 9.27 for IP admission.    Plan  -admit to IP on 9.27  -start Abilify 5mg daily for psychosis/mood symptoms r/o edda  -Haldol 5mg/Ativan 2mg/Benadryl 50mg [id number] PRN PO/IV/IM for agitation  -iron supplementation for anemia

## 2023-12-18 NOTE — BH INPATIENT PSYCHIATRY ASSESSMENT NOTE - NSBHMETABOLIC_PSY_ALL_CORE_FT
BMI:   HbA1c:   Glucose:   BP: 115/73 (12-18-23 @ 07:56) (102/70 - 149/103)Vital Signs Last 24 Hrs  T(C): 37 (12-18-23 @ 14:01), Max: 37.2 (12-18-23 @ 00:48)  T(F): 98.6 (12-18-23 @ 14:01), Max: 98.9 (12-18-23 @ 00:48)  HR: 93 (12-18-23 @ 07:56) (90 - 93)  BP: 115/73 (12-18-23 @ 07:56) (102/70 - 116/61)  BP(mean): --  RR: 20 (12-18-23 @ 14:01) (15 - 20)  SpO2: 100% (12-18-23 @ 07:56) (99% - 100%)    Orthostatic VS  12-18-23 @ 14:01  Lying BP: --/-- HR: --  Sitting BP: 138/81 HR: 116  Standing BP: 143/96 HR: 124  Site: --  Mode: --    Lipid Panel:  BMI:   HbA1c:   Glucose:   BP: 115/73 (12-18-23 @ 07:56) (102/70 - 149/103)Vital Signs Last 24 Hrs  T(C): 37 (12-18-23 @ 14:01), Max: 37.2 (12-18-23 @ 00:48)  T(F): 98.6 (12-18-23 @ 14:01), Max: 98.9 (12-18-23 @ 00:48)  HR: 93 (12-18-23 @ 07:56) (90 - 93)  BP: 115/73 (12-18-23 @ 07:56) (102/70 - 116/61)  BP(mean): --  RR: 20 (12-18-23 @ 14:01) (16 - 20)  SpO2: 100% (12-18-23 @ 07:56) (99% - 100%)    Orthostatic VS  12-18-23 @ 14:01  Lying BP: --/-- HR: --  Sitting BP: 138/81 HR: 116  Standing BP: 143/96 HR: 124  Site: --  Mode: --    Lipid Panel:  BMI:   HbA1c:   Glucose:   BP: 115/73 (12-18-23 @ 07:56) (102/70 - 149/103)Vital Signs Last 24 Hrs  T(C): 37 (12-18-23 @ 14:01), Max: 37.2 (12-18-23 @ 00:48)  T(F): 98.6 (12-18-23 @ 14:01), Max: 98.9 (12-18-23 @ 00:48)  HR: 93 (12-18-23 @ 07:56) (93 - 93)  BP: 115/73 (12-18-23 @ 07:56) (115/73 - 116/61)  BP(mean): --  RR: 20 (12-18-23 @ 14:01) (16 - 20)  SpO2: 100% (12-18-23 @ 07:56) (99% - 100%)    Orthostatic VS  12-18-23 @ 14:01  Lying BP: --/-- HR: --  Sitting BP: 138/81 HR: 116  Standing BP: 143/96 HR: 124  Site: --  Mode: --    Lipid Panel:

## 2023-12-18 NOTE — ED ADULT NURSE REASSESSMENT NOTE - NS ED NURSE REASSESS COMMENT FT1
Received report from PM RN. Patient is calm and cooperative. Received breakfast and is a little restless. Will continue to monitor.Waiting for bed assignment.  VERENA Bean

## 2023-12-18 NOTE — BH INPATIENT PSYCHIATRY ASSESSMENT NOTE - NSBHCHARTREVIEWLAB_PSY_A_CORE FT
12-17    139  |  103  |  14  ----------------------------<  96  3.9   |  18<L>  |  0.69    Ca    9.3      17 Dec 2023 16:58    TPro  7.5  /  Alb  4.3  /  TBili  0.4  /  DBili  x   /  AST  31  /  ALT  14  /  AlkPhos  56  12-17                          8.3    5.11  )-----------( 374      ( 17 Dec 2023 16:58 )             27.6

## 2023-12-18 NOTE — BH INPATIENT PSYCHIATRY ASSESSMENT NOTE - RISK ASSESSMENT
Acute risk factors: not receiving treatment in the setting of new onset of psychiatric symptoms     Protective factors: no current SIIP/HIIP, no h/o SA, no h/o psych admissions, no access to weapons, no active substance abuse, good physical health, no psychosis, engaged in work, stable home with family, social supports    Overall, pt is a low risk of harm to self/others Acute risk factors: not receiving treatment in the setting of new onset of psychiatric symptoms , insomnia, mood lability, increased goal directed activity, running away from police, hyper-Jainism thought content    Protective factors: no current SIIP/HIIP, no h/o SA, no h/o psych admissions, no access to weapons, no active substance abuse, good physical health, no psychosis, engaged in work, stable home with family, social supports    Overall, pt is at an elevated risk of harm to self or others due to above acute risk factors and therefore requires inpatient hospitalization for safety and stabilization. Acute risk factors: not receiving treatment in the setting of new onset of psychiatric symptoms , insomnia, mood lability, increased goal directed activity, running away from police, hyper-Congregation thought content    Protective factors: no current SIIP/HIIP, no h/o SA, no h/o psych admissions, no access to weapons, no active substance abuse, good physical health, no psychosis, engaged in work, stable home with family, social supports    Overall, pt is at an elevated risk of harm to self or others due to above acute risk factors and therefore requires inpatient hospitalization for safety and stabilization.

## 2023-12-18 NOTE — ED BEHAVIORAL HEALTH PROGRESS NOTE - BED AVAILABILITY
Med list received and is UTD in Epic, Taking as directed and as set up per [facility].    Lack of inpatient Psychiatry bed...

## 2023-12-18 NOTE — ED ADULT NURSE REASSESSMENT NOTE - NS ED NURSE REASSESS COMMENT FT1
Called [facility]. Report given to Floor RN. Spoke with patients mom and all personal belongings including clothing were taken home by mom.  VERENA Bean

## 2023-12-18 NOTE — ED BEHAVIORAL HEALTH PROGRESS NOTE - ATTENDING COMMENTS
23F with no psych hx presents for erratic behavior. Patient exhibiting mood lability and thought disorganization. Has been bizarre and threatening at home. Poor sleep and religiously preoccupied. Cannot care for self, concerning for new onset edda and psychosis. Requires involuntary admission. Does not requires 1:1 in locked supervised setting.

## 2023-12-18 NOTE — BH INPATIENT PSYCHIATRY ASSESSMENT NOTE - NSCOMMENTSUICRISKFACT_PSY_ALL_CORE
Mildly elevated risk given new onset of psychiatric symptom of edda and diagnosis, but otherwise denying SIIP.

## 2023-12-18 NOTE — BH INPATIENT PSYCHIATRY ASSESSMENT NOTE - HPI (INCLUDE ILLNESS QUALITY, SEVERITY, DURATION, TIMING, CONTEXT, MODIFYING FACTORS, ASSOCIATED SIGNS AND SYMPTOMS)
Pt is 22yo F, domiciled with family, employed as a nanny, no PPH or inpatient admission, no previous psych meds, no previous suicide attempts, no NSSIB, no legal issues, no firearms in the home, no substance use, PMH of anemia and fibroid, BIBEMS for disorganized behaviors at home.    Patient reports that about 1 week ago, she started not feeling well physically (hip issues). As a result, she reports spending most of the days laying around with low energy. She sought medical help and visited ED without significant findings and was sent home. All of a sudden last Friday, she started feeling better and reports feeling "amazing" as the body was healing. She felt energetic in response (though clarifies that it was not excessive). That day she started cleaning her room for several hours, which had been neglected during her physical ailment and her mom had made a remark about. Describes that she was "shuffling" between tasks. In between cleaning, she decided to undo her box javid, which she did. She also says that she was reading the bible and praying intermittently. She reports being a "child of Damian", though she adds that not many are aware of this as she had not been externally devout for a long time. Her behavior worried her mother, who recommended that she seek emergency care. Patient declined. Next day, she spent her day with her boyfriend mostly talking and was brought back home. On Sunday, she ended up visiting her aunt (could not provide a reason) who also began expressing worries about the patient. Pt kept repeating that she felt well and there were no physical issues. She also noticed that her boyfriend was outside of aunt's house, which was a surprise. She went outside to talk to her boyfriend and disregarded the aunt's request to stay home. Next thing she remembers, there was an ambulance. She started running away, because she felt that she did not need to go to the hospital. Ultimately EMS brought her to [facility], where she continued feel anxious and agitated because she in the hospital against her will.    Since Friday, patient notes that her sleep was "scattered" though she slept for at least "couple of hours" each day. She could not provide specific amount.  She does mention that people around her had commented that she was speaking faster compared to baseline. Denies irritability, distractibility, and taking impulsive actions. Denies h/o of depressive episode lasting > 2wks though acknowledges feeling depressed in reaction to acute stressors. Denies psychotic symptoms including delusions and hallucinations. Admits to occasional cannabis use (couple times a year). Otherwise denies substance use including nicotine, alcohol, and prescriptions medications. Denies current suicidal ideation, intent, or plan. Denies suicide attempt. Denies current and h/o HIIP.    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    Patient reports that about 1 week ago, she started not feeling well physically (hip issues). As a result, she reports spending most of the days laying around with low energy. She sought medical help and visited ED without significant findings and was sent home. All of a sudden last Friday, she started feeling better and reports feeling "amazing" as the body was healing. She felt energetic in response (though clarifies that it was not excessive). That day she started cleaning her room for several hours, which had been neglected during her physical ailment and her mom had made a remark about. Describes that she was "shuffling" between tasks. In between cleaning, she decided to undo her box javid, which she did. She also says that she was reading the bible and praying intermittently. She reports being a "child of Damian", though she adds that not many are aware of this as she had not been externally devout for a long time. Her behavior worried her mother, who recommended that she seek emergency care. Patient declined. Next day, she spent her day with her boyfriend mostly talking and was brought back home. On Sunday, she ended up visiting her aunt (could not provide a reason) who also began expressing worries about the patient. Pt kept repeating that she felt well and there were no physical issues. She also noticed that her boyfriend was outside of aunt's house, which was a surprise. She went outside to talk to her boyfriend and disregarded the aunt's request to stay home. Next thing she remembers, there was an ambulance. She started running away, because she felt that she did not need to go to the hospital. Ultimately EMS brought her to [facility], where she continued feel anxious and agitated because she in the hospital against her will.    Since Friday, patient notes that her sleep was "scattered" though she slept for at least "couple of hours" each day. She could not provide specific amount.  She does mention that people around her had commented that she was speaking faster compared to baseline. Denies irritability, distractibility, and taking impulsive actions. Denies h/o of depressive episode lasting > 2wks though acknowledges feeling depressed in reaction to acute stressors. Denies psychotic symptoms including delusions and hallucinations. Admits to occasional cannabis use (couple times a year). Otherwise denies substance use including nicotine, alcohol, and prescriptions medications. Denies current suicidal ideation, intent, or plan. Denies suicide attempt. Denies current and h/o HIIP.    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    Patient reports that about 1 week ago, she started not feeling well physically (hip issues). As a result, she reports spending most of the days laying around with low energy. She sought medical help and visited ED without significant findings and was sent home. All of a sudden last Friday, she started feeling better and reports feeling "amazing" as the body was healing. She felt energetic in response (though clarifies that it was not excessive). That day she started cleaning her room for several hours, which had been neglected during her physical ailment and her mom had made a remark about. Describes that she was "shuffling" between tasks. In between cleaning, she decided to undo her box javid, which she did. She also says that she was reading the bible and praying intermittently. She reports being a "child of Damian", though she adds that not many are aware of this as she had not been externally devout for a long time. Her behavior worried her mother, who recommended that she seek emergency care. Patient declined. Next day, she spent her day with her boyfriend mostly talking and was brought back home. On Sunday, she ended up visiting her aunt (could not provide a reason) who also began expressing worries about the patient. Pt kept repeating that she felt well and there were no physical issues. She also noticed that her boyfriend was outside of aunt's house, which was a surprise. She went outside to talk to her boyfriend and disregarded the aunt's request to stay home. Next thing she remembers, there was an ambulance. She started running away, because she felt that she did not need to go to the hospital. Ultimately EMS brought her to [facility], where she continued feel anxious and agitated because she in the hospital against her will.    Since Friday, patient notes that her sleep was "scattered" though she slept for at least "couple of hours" each day. She could not provide specific amount.  She does mention that people around her had commented that she was speaking faster compared to baseline. Denies irritability, distractibility, and taking impulsive actions. Denies h/o of depressive episode lasting > 2wks though acknowledges feeling depressed in reaction to acute stressors. Denies psychotic symptoms including delusions and hallucinations. Admits to occasional cannabis use (couple times a year). Otherwise denies substance use including nicotine, alcohol, and prescriptions medications. Denies current suicidal ideation, intent, or plan. Denies suicide attempt. Denies current and h/o HIIP.       PER ED BH Assessment Note documented in [facility] on 12/17:  "Pt seen with boyfriend. Pt is a limited historian. Pt says she is not sure why she is here, "ask my aunt." Pt makes comments that make limited sense when asked about what events led her to the hospital, speaking about differences in people and being "authentic self." Pt says that she has been sleeping well, has been "working on herself." Pt endorses good appetite, good sleep, good mood, denies SI/HI/AVH. Denies all substance use. Falls asleep during interview, asks writer to leave  Writer interviews pt's boyfriend outside of the room, provides most of the history. States that the patient was her normal self until this last weekend when she started feeling like her "hips were out of whack." Went to the chiropractor this weekend, has been hyperfixated on hips and walking since then. Says that starting on Sunday, pt had started to have moods that ranged from "hyper and too up" to irritable. Says that she did not sleep Sunday through Wednesday, went to the ED at an outside hospital on Wednesday where she received Xanax and slept that night. Did not sleep the nights since then for more than 1-2 hours each night, has had more energy than normal, was noted to speak more rapidly than normal, difficult but possible to interrupt. 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 Has been saying this repeatedly throughout the week. Pt noted to not be Nondenominational normally but has been hyper-Nondenominational this week. Also pt noted to have increase in goal-directed behaviors such as cleaning, noted to be cleaning excessively and difficult to get to stop cleaning. Pt also doing behaviors that do not make sense like running out of car to "go to [employer]" then running behind the store and come back and say that she went into the store to buy something when she did not. Noted to be mumbling to self as well so boyfriend unsure if she is responding to voices. Today aunt called police on patient due to her behaviors, noted that she ran away from the police yelling "I bind you and blind you." Per boyfriend, pt never had any other symptoms such as this before. Denies any known periods of time where she appeared sad or down. No substance use noted with patient. Pt has anemia per boyfriend but not on any other medications. 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    Patient reports that about 1 week ago, she started not feeling well physically (hip issues). As a result, she reports spending most of the days laying around with low energy. She sought medical help and visited ED without significant findings and was sent home. All of a sudden last Friday, she started feeling better and reports feeling "amazing" as the body was healing. She felt energetic in response (though clarifies that it was not excessive). That day she started cleaning her room for several hours, which had been neglected during her physical ailment and her mom had made a remark about. Describes that she was "shuffling" between tasks. In between cleaning, she decided to undo her box javid, which she did. She also says that she was reading the bible and praying intermittently. She reports being a "child of Damian", though she adds that not many are aware of this as she had not been externally devout for a long time. Her behavior worried her mother, who recommended that she seek emergency care. Patient declined. Next day, she spent her day with her boyfriend mostly talking and was brought back home. On Sunday, she ended up visiting her aunt (could not provide a reason) who also began expressing worries about the patient. Pt kept repeating that she felt well and there were no physical issues. She also noticed that her boyfriend was outside of aunt's house, which was a surprise. She went outside to talk to her boyfriend and disregarded the aunt's request to stay home. Next thing she remembers, there was an ambulance. She started running away, because she felt that she did not need to go to the hospital. Ultimately EMS brought her to [facility], where she continued feel anxious and agitated because she in the hospital against her will.    Since Friday, patient notes that her sleep was "scattered" though she slept for at least "couple of hours" each day. She could not provide specific amount.  She does mention that people around her had commented that she was speaking faster compared to baseline. Denies irritability, distractibility, and taking impulsive actions. Denies h/o of depressive episode lasting > 2wks though acknowledges feeling depressed in reaction to acute stressors. Denies psychotic symptoms including delusions and hallucinations. Admits to occasional cannabis use (couple times a year). Otherwise denies substance use including nicotine, alcohol, and prescriptions medications. Denies current suicidal ideation, intent, or plan. Denies suicide attempt. Denies current and h/o HIIP.       PER ED BH Assessment Note documented in [facility] on 12/17:  "Pt seen with boyfriend. Pt is a limited historian. Pt says she is not sure why she is here, "ask my aunt." Pt makes comments that make limited sense when asked about what events led her to the hospital, speaking about differences in people and being "authentic self." Pt says that she has been sleeping well, has been "working on herself." Pt endorses good appetite, good sleep, good mood, denies SI/HI/AVH. Denies all substance use. Falls asleep during interview, asks writer to leave  Writer interviews pt's boyfriend outside of the room, provides most of the history. States that the patient was her normal self until this last weekend when she started feeling like her "hips were out of whack." Went to the chiropractor this weekend, has been hyperfixated on hips and walking since then. Says that starting on Sunday, pt had started to have moods that ranged from "hyper and too up" to irritable. Says that she did not sleep Sunday through Wednesday, went to the ED at an outside hospital on Wednesday where she received Xanax and slept that night. Did not sleep the nights since then for more than 1-2 hours each night, has had more energy than normal, was noted to speak more rapidly than normal, difficult but possible to interrupt. 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 Has been saying this repeatedly throughout the week. Pt noted to not be Rastafarian normally but has been hyper-Rastafarian this week. Also pt noted to have increase in goal-directed behaviors such as cleaning, noted to be cleaning excessively and difficult to get to stop cleaning. Pt also doing behaviors that do not make sense like running out of car to "go to [employer]" then running behind the store and come back and say that she went into the store to buy something when she did not. Noted to be mumbling to self as well so boyfriend unsure if she is responding to voices. Today aunt called police on patient due to her behaviors, noted that she ran away from the police yelling "I bind you and blind you." Per boyfriend, pt never had any other symptoms such as this before. Denies any known periods of time where she appeared sad or down. No substance use noted with patient. Pt has anemia per boyfriend but not on any other medications. "

## 2023-12-18 NOTE — BH INPATIENT PSYCHIATRY ASSESSMENT NOTE - MSE UNSTRUCTURED FT
The patient appears stated age, fair hygiene and dressed appropriately.    The patient was calm, cooperative with the interview and appropriate relatedness. Intermittent eye contact.   No psychomotor agitation or retardation noted.  Steady gait observed.    The patient's speech was fluent, normal in tone, rate, and volume. Though somewhat hyperverbal without being pressured. Easily interruptible and re-directable.   The patient's mood is "okay right now."  Affect is euthymic, stable and appropriate.    Thought process is overall linear and goal directed. Though at times circumstantial.   Denies any delusions or hallucinations. Denies any suicidal or homicidal ideation, intent, or plan.    Insight is poor.  Judgment is poor to fair.  Impulse control has been fair on the unit. The patient appears stated age, fair hygiene and dressed appropriately.    The patient was calm, cooperative with the interview and appropriate relatedness. Intermittent eye contact.   No psychomotor agitation or retardation noted.  Steady gait observed.    The patient's speech was fluent, normal in tone, rate, and volume. Though somewhat hyperverbal without being pressured. Easily interruptible and re-directable.   The patient's mood is "okay right now."  Affect is euthymic, stable and appropriate.  Thought process is overall linear and goal directed. Though at times circumstantial.   Denies any delusions or hallucinations. Denies any suicidal or homicidal ideation, intent, or plan.    Insight is poor.  Judgment is poor to fair.  Impulse control has been fair on the unit. The patient appears stated age, fair hygiene and dressed appropriately.    The patient was calm, cooperative with the interview and appropriate relatedness. Intermittent to avoidant eye contact.   No psychomotor agitation or retardation noted.  Steady gait observed.    The patient's speech was fluent, normal in tone and volume, rapid at times. Though somewhat hyperverbal without being pressured. Easily interruptible and re-directable.   The patient's mood is "okay right now."  Affect is euthymic, stable and appropriate.  Thought process is overall linear and goal directed. Though at times circumstantial.   Denies any delusions or hallucinations. Denies any suicidal or homicidal ideation, intent, or plan.    Insight is poor.  Judgment is poor to fair.  Impulse control has been fair on the unit.

## 2023-12-18 NOTE — ED BEHAVIORAL HEALTH PROGRESS NOTE - SUMMARY
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    Pt presents with symptoms of edda including intermittently elevated than irritable mood, grandiosity, decreased need for sleep, increased energy, increased goal-directed behavior, erratic behaviors, along with Rastafari preoccupations and some noted responding to internal stimuli from family members. This is the patient's first manic episode per collateral. The patient does not use substances so this does not appear to be substance related. Pt requires inpatient psychiatric admission for acute stabilization and to minimize risk to self and others, will sign a 9.27 for IP admission.    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    Pt presents with symptoms of edda including intermittently elevated than irritable mood, grandiosity, decreased need for sleep, increased energy, increased goal-directed behavior, erratic behaviors, along with Syn[employer] preoccupations and some noted responding to internal stimuli from family members. This is the patient's first manic episode per collateral. The patient does not use substances so this does not appear to be substance related. Pt requires inpatient psychiatric admission for acute stabilization and to minimize risk to self and others, will sign a 9.27 for IP admission.    Plan  -admit to IP on 9.27  -start Abilify 5mg daily for edda  -Haldol 5mg/Ativan 2mg/Benadryl 50mg [id number] PRN PO/IV/IM for agitation  -iron supplementation for anemia

## 2023-12-18 NOTE — BH INPATIENT PSYCHIATRY ASSESSMENT NOTE - NSBHASSESSSUMMFT_PSY_ALL_CORE
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    Diagnostic impression on admission is bipolar I or II disorder. r/o psychosis.    Today overall, she is well related and cooperative. Patient does endorse some symptoms of edda leading up to admission namely elevated mood and energy, as well as endorsement of goal directed activity that sounds impulsive and disorganized. However, exam is mostly unremarkable without significant thought or behavioral disorganization, pressured speech, or psychomotor activation. Though at times demonstrates hyperverbalness in speech and circumstantial in thought. ED assessment and collateral were consistent with clear and significant manic symptoms, and her presentation today likely reflects effects of PRN medications (including IM versed, haldol) and good sleep in ED. Will need continued monitoring for any re-emergence of symptoms. Appropriate to continue abilify trial that was started in the ED.     Patient needs continued hospitalization for management of manic symptoms that impair patient's ability to care for self.     Plan:  1. Legal: Admitted on/continue 9.27 status  2. Safety: No reported SI/SIB/HI/VI currently on unit; continue routine observation.  	-Haldol/Ativan/Benadryl PRN medications for safety/agitation  3. Psychiatric:  	- ARIPIPRAZOLE trial: continue 5mg po daily for edda >> titrate until effect and tolerability; R/B/A and side effects discussed  4. Therapy: group & milieu therapy  5. Medical:   	#Anemia (Hgb 8.3) - continue home ferrous sulfate  6. Collateral: Collateral from boyfriend (ED)   7. 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    Diagnostic impression on admission is bipolar I or II disorder. r/o psychosis.    Today overall, she is well related and cooperative. Patient does endorse some symptoms of edda leading up to admission namely elevated mood and energy, as well as endorsement of goal directed activity that sounds impulsive and disorganized. However, exam is mostly unremarkable without significant thought or behavioral disorganization, pressured speech, or psychomotor activation. Though at times demonstrates hyperverbalness in speech and circumstantial in thought. ED assessment and collateral were consistent with clear and significant manic symptoms, and her presentation today likely reflects effects of PRN medications (including IM versed, haldol) and good sleep in ED. Will need continued monitoring for any re-emergence of symptoms. Appropriate to continue abilify trial that was started in the ED.     Patient needs continued hospitalization for management of manic symptoms that impair patient's ability to care for self.     Plan:  1. Legal: Admitted on/continue 9.27 status  2. Safety: No reported SI/SIB/HI/VI currently on unit; continue routine observation.  	-Haldol/Ativan/Benadryl PRN medications for safety/agitation  3. Psychiatric:  	- ARIPIPRAZOLE trial: continue 5mg po daily for edda >> titrate until effect and tolerability; R/B/A and side effects discussed  4. Therapy: group & milieu therapy  5. Medical:   	#Anemia (Hgb 8.3) - continue home ferrous sulfate  6. Collateral: Collateral from boyfriend (ED)   7. 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    Diagnostic impression on admission is bipolar I or II disorder. r/o psychosis.    Today overall, she is well related and cooperative. Patient does endorse some symptoms of edda leading up to admission namely elevated mood and energy, as well as endorsement of goal directed activity that sounds impulsive and disorganized. However, exam is mostly unremarkable without significant thought or behavioral disorganization, pressured speech, or psychomotor activation. Though at times demonstrates hyperverbalness in speech and circumstantial in thought. ED assessment and collateral were consistent with clear and significant manic symptoms, and her presentation today likely reflects effects of PRN medications (including IM versed, haldol) and good sleep in ED. Will need continued monitoring for any re-emergence of symptoms. Appropriate to continue abilify trial that was started in the ED. Also need to expand collateral from family.    Patient needs continued hospitalization for management of manic symptoms that impair patient's ability to care for self.     Plan:  1. Legal: Admitted on/continue 9.27 status  2. Safety: No reported SI/SIB/HI/VI currently on unit; continue routine observation.  	-Haldol/Ativan/Benadryl PRN medications for safety/agitation  3. Psychiatric:  	- ARIPIPRAZOLE trial: continue 5mg po daily for edda >> titrate until effect and tolerability; R/B/A and side effects discussed  4. Therapy: group & milieu therapy  5. Medical:   	#Anemia (Hgb 8.3) - continue home ferrous sulfate  	# re-order Utox  6. Collateral: Collateral from boyfriend (ED); expand collateral from Mom  7. Disposition: When stable/pending clinical improvement

## 2023-12-18 NOTE — ED BEHAVIORAL HEALTH PROGRESS NOTE - DETAILS:
Met with patient at bedside. She appeared sedated but was verbal. She reported she came to the ED because her Aunt and BF. She stated her Aunt wouldn't let her see her BF but she did. She stated she was suspicious of Aunt and BF thinking maybe they were trying to do something to her. She was vague regarding their intentions and unable to elaborate further. She stated that her Aunt called 911 and then an ambulance was chasing her and is she is unsure why.     Patient denies manic/hypomanic, psychotic symptoms or depressive symptoms . Patient adamantly denies SI, intent or plan; denies any HI, violent thoughts.

## 2023-12-19 LAB
AMPHET UR-MCNC: NEGATIVE — SIGNIFICANT CHANGE UP
AMPHET UR-MCNC: NEGATIVE — SIGNIFICANT CHANGE UP
BARBITURATES UR SCN-MCNC: NEGATIVE — SIGNIFICANT CHANGE UP
BARBITURATES UR SCN-MCNC: NEGATIVE — SIGNIFICANT CHANGE UP
BASOPHILS # BLD AUTO: 0.1 K/UL — SIGNIFICANT CHANGE UP (ref 0–0.2)
BASOPHILS # BLD AUTO: 0.1 K/UL — SIGNIFICANT CHANGE UP (ref 0–0.2)
BASOPHILS NFR BLD AUTO: 2.4 % — HIGH (ref 0–2)
BASOPHILS NFR BLD AUTO: 2.4 % — HIGH (ref 0–2)
BENZODIAZ UR-MCNC: POSITIVE
BENZODIAZ UR-MCNC: POSITIVE
COCAINE METAB.OTHER UR-MCNC: NEGATIVE — SIGNIFICANT CHANGE UP
COCAINE METAB.OTHER UR-MCNC: NEGATIVE — SIGNIFICANT CHANGE UP
CREATININE URINE RESULT, DAU: 222 MG/DL — SIGNIFICANT CHANGE UP
CREATININE URINE RESULT, DAU: 222 MG/DL — SIGNIFICANT CHANGE UP
EOSINOPHIL # BLD AUTO: 0.07 K/UL — SIGNIFICANT CHANGE UP (ref 0–0.5)
EOSINOPHIL # BLD AUTO: 0.07 K/UL — SIGNIFICANT CHANGE UP (ref 0–0.5)
EOSINOPHIL NFR BLD AUTO: 1.7 % — SIGNIFICANT CHANGE UP (ref 0–6)
EOSINOPHIL NFR BLD AUTO: 1.7 % — SIGNIFICANT CHANGE UP (ref 0–6)
HCT VFR BLD CALC: 30.1 % — LOW (ref 34.5–45)
HCT VFR BLD CALC: 30.1 % — LOW (ref 34.5–45)
HGB BLD-MCNC: 9 G/DL — LOW (ref 11.5–15.5)
HGB BLD-MCNC: 9 G/DL — LOW (ref 11.5–15.5)
IANC: 2.61 K/UL — SIGNIFICANT CHANGE UP (ref 1.8–7.4)
IANC: 2.61 K/UL — SIGNIFICANT CHANGE UP (ref 1.8–7.4)
IMM GRANULOCYTES NFR BLD AUTO: 0.5 % — SIGNIFICANT CHANGE UP (ref 0–0.9)
IMM GRANULOCYTES NFR BLD AUTO: 0.5 % — SIGNIFICANT CHANGE UP (ref 0–0.9)
LYMPHOCYTES # BLD AUTO: 1.08 K/UL — SIGNIFICANT CHANGE UP (ref 1–3.3)
LYMPHOCYTES # BLD AUTO: 1.08 K/UL — SIGNIFICANT CHANGE UP (ref 1–3.3)
LYMPHOCYTES # BLD AUTO: 26.1 % — SIGNIFICANT CHANGE UP (ref 13–44)
LYMPHOCYTES # BLD AUTO: 26.1 % — SIGNIFICANT CHANGE UP (ref 13–44)
MCHC RBC-ENTMCNC: 22.9 PG — LOW (ref 27–34)
MCHC RBC-ENTMCNC: 22.9 PG — LOW (ref 27–34)
MCHC RBC-ENTMCNC: 29.9 GM/DL — LOW (ref 32–36)
MCHC RBC-ENTMCNC: 29.9 GM/DL — LOW (ref 32–36)
MCV RBC AUTO: 76.6 FL — LOW (ref 80–100)
MCV RBC AUTO: 76.6 FL — LOW (ref 80–100)
METHADONE UR-MCNC: NEGATIVE — SIGNIFICANT CHANGE UP
METHADONE UR-MCNC: NEGATIVE — SIGNIFICANT CHANGE UP
MONOCYTES # BLD AUTO: 0.26 K/UL — SIGNIFICANT CHANGE UP (ref 0–0.9)
MONOCYTES # BLD AUTO: 0.26 K/UL — SIGNIFICANT CHANGE UP (ref 0–0.9)
MONOCYTES NFR BLD AUTO: 6.3 % — SIGNIFICANT CHANGE UP (ref 2–14)
MONOCYTES NFR BLD AUTO: 6.3 % — SIGNIFICANT CHANGE UP (ref 2–14)
NEUTROPHILS # BLD AUTO: 2.61 K/UL — SIGNIFICANT CHANGE UP (ref 1.8–7.4)
NEUTROPHILS # BLD AUTO: 2.61 K/UL — SIGNIFICANT CHANGE UP (ref 1.8–7.4)
NEUTROPHILS NFR BLD AUTO: 63 % — SIGNIFICANT CHANGE UP (ref 43–77)
NEUTROPHILS NFR BLD AUTO: 63 % — SIGNIFICANT CHANGE UP (ref 43–77)
NRBC # BLD: 0 /100 WBCS — SIGNIFICANT CHANGE UP (ref 0–0)
NRBC # BLD: 0 /100 WBCS — SIGNIFICANT CHANGE UP (ref 0–0)
NRBC # FLD: 0 K/UL — SIGNIFICANT CHANGE UP (ref 0–0)
NRBC # FLD: 0 K/UL — SIGNIFICANT CHANGE UP (ref 0–0)
OPIATES UR-MCNC: NEGATIVE — SIGNIFICANT CHANGE UP
OPIATES UR-MCNC: NEGATIVE — SIGNIFICANT CHANGE UP
OXYCODONE UR-MCNC: NEGATIVE — SIGNIFICANT CHANGE UP
OXYCODONE UR-MCNC: NEGATIVE — SIGNIFICANT CHANGE UP
PCP SPEC-MCNC: SIGNIFICANT CHANGE UP
PCP SPEC-MCNC: SIGNIFICANT CHANGE UP
PCP UR-MCNC: NEGATIVE — SIGNIFICANT CHANGE UP
PCP UR-MCNC: NEGATIVE — SIGNIFICANT CHANGE UP
PLATELET # BLD AUTO: 432 K/UL — HIGH (ref 150–400)
PLATELET # BLD AUTO: 432 K/UL — HIGH (ref 150–400)
RBC # BLD: 3.93 M/UL — SIGNIFICANT CHANGE UP (ref 3.8–5.2)
RBC # BLD: 3.93 M/UL — SIGNIFICANT CHANGE UP (ref 3.8–5.2)
RBC # FLD: 17.8 % — HIGH (ref 10.3–14.5)
RBC # FLD: 17.8 % — HIGH (ref 10.3–14.5)
THC UR QL: POSITIVE
THC UR QL: POSITIVE
WBC # BLD: 4.14 K/UL — SIGNIFICANT CHANGE UP (ref 3.8–10.5)
WBC # BLD: 4.14 K/UL — SIGNIFICANT CHANGE UP (ref 3.8–10.5)
WBC # FLD AUTO: 4.14 K/UL — SIGNIFICANT CHANGE UP (ref 3.8–10.5)
WBC # FLD AUTO: 4.14 K/UL — SIGNIFICANT CHANGE UP (ref 3.8–10.5)

## 2023-12-19 PROCEDURE — 99232 SBSQ HOSP IP/OBS MODERATE 35: CPT | Mod: GC

## 2023-12-19 NOTE — BH SOCIAL WORK INITIAL PSYCHOSOCIAL EVALUATION - OTHER PAST PSYCHIATRIC HISTORY (INCLUDE DETAILS REGARDING ONSET, COURSE OF ILLNESS, INPATIENT/OUTPATIENT TREATMENT)
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 She lives with her family, mother, [phone]. Patient has no formal psychiatric history or treatment. She had a sudden change in behavior. She was not sleeping, was having an increase in goal directed activities, was speaking very quickly and doing unusual things like showing up at her aunt's house. She became religiously preoccupied and non-sensical with fluctuating mood. She does not want to be in the hospital and has been anxious and agitated. Of note, her father is, reportedly, diagnosed with Bipolar Disorder. 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 Her boyfriend provided collateral information. Patient will need referrals for outpatient providers and may be appropriate for ETP or Bipolar Clinic. She has [employer] PPO/[employer]PP[employer] Insurance. 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 She lives with her family, mother, [phone]. Patient has no formal psychiatric history or treatment. She had a sudden change in behavior. She was not sleeping, was having an increase in goal directed activities, was speaking very quickly and doing unusual things like showing up at her aunt's house. She became religiously preoccupied and non-sensical with fluctuating mood. She does not want to be in the hospital and has been anxious and agitated. Of note, her father is, reportedly, diagnosed with Bipolar Disorder. 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 Her boyfriend provided collateral information. Patient will need referrals for outpatient providers and may be appropriate for ETP or Bipolar Clinic. She has [employer] P[employer]/EPPO Insurance.

## 2023-12-19 NOTE — BH INPATIENT PSYCHIATRY PROGRESS NOTE - MSE UNSTRUCTURED FT
The patient appears stated age, fair hygiene and dressed appropriately.    The patient was calm, cooperative with the interview and but overall flat relatedness. Minimal eye contact, looking straight to wall opposite.   No psychomotor agitation or retardation noted.  Steady gait observed.    The patient's speech was fluent, normal in tone and volume, rapid at times. Not pressured.   The patient's mood is "perfectly calm".  Affect is flat, constricted, and somewhat blunted. Mood incongruent.  Thought process is overall linear and goal directed.   Denies any delusions or hallucinations. Denies any suicidal or homicidal ideation, intent, or plan.    Insight is poor.  Judgment is poor to fair.  Impulse control has been fair on the unit.

## 2023-12-19 NOTE — BH CHART NOTE - NSEVENTNOTEFT_PSY_ALL_CORE
Called mother (Gilberto Lind): [phone]    Started last Saturday (12/9). Took her to chiropractor, ran inside ahead of the mother. Aligned her hips/pelvis. Next day Sunday, still concerned about her hip. Wanted to go to ED and asked for insurance. Apparently she has been going to doctors for months without anyone knowing. Did not sleep Monday night (asked to sleep in the same bed with her mom which was unusual for her). But unsure if she was sleeping as she spends most nights with boyfriend. Not eating. Not sleeping. Wednesday she took her to ER in [facility]. Starts looking up symptoms worried about leukemia and other significant. Gave her xanax in the ED and got some sleep. CT scan was scheduled for outpatient. She comes back in Friday afternoon. God cured her. No javid. Earrings off. Praying all day and reading bible. Locked her room all day despite her mother asking her to come out. At last when she came out, she talked about "impurities" and that chiropractor is the "demon". Mother asked pt to go to ED but she continued to refuse. Father lives in [place] whom she reached out to help. Saturday pt was with boyfriend.     2021 she left. She lived in [place], doing her own thing. 1.5 years ago she came back. Meticulous about keeping her room clean (used to be). But then .  More reserved. 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 Always going to the gym, everyday.     She is health conscious. But not illness anxiety. Body dysmorphia? She used to be heavier--ate meat, --> didn't feel bad and throw up.     2 years ago return to school. Just hasn't really tried to return. During COVID she took time off. Having difficulty getting a job.     Known boyfriend since childhood.     She would start something but it will fall apart.    Father has bipolar disorder.     Near the end of high school== she was cutting forearm Called mother (Gilberto Lind): [phone]    Started last Saturday (12/9). Took her to chiropractor, ran inside ahead of the mother. Aligned her hips/pelvis. Next day Sunday, still concerned about her hip. Wanted to go to ED and asked for insurance. Apparently she has been going to doctors for months without anyone knowing. Did not sleep Monday night (asked to sleep in the same bed with her mom which was unusual for her). But unsure if she was sleeping as she spends most nights with boyfriend. Not eating. Not sleeping. Wednesday she took her to ER in [facility]. Starts looking up symptoms worried about leukemia and other significant. Gave her xanax in the ED and got some sleep. CT scan was scheduled for outpatient. She comes back in Friday afternoon. God cured her. No javid. Earrings off. Praying all day and reading bible. Locked her room all day despite her mother asking her to come out. At last when she came out, she talked about "impurities" and that chiropractor is the "demon". Mother asked pt to go to ED but she continued to refuse. Father lives in [place] whom she reached out to help. Saturday pt was with boyfriend.     2021 she left. She lived in [place], doing her own thing. 1.5 years ago she came back. Meticulous about keeping her room clean (used to be). But then .  More reserved. 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 Always going to the gym, everyday.     She is health conscious. But not illness anxiety. Body dysmorphia? She used to be heavier--ate meat, --> didn't feel bad and throw up.     2 years ago return to school. Just hasn't really tried to return. During COVID she took time off. Having difficulty getting a job.     Known boyfriend since childhood.     She would start something but it will fall apart.    Father has bipolar disorder.     Near the end of high school== she was cutting forearm Called mother (Gilberto Lind): [phone]    mother first noticed odd things starting last Saturday (12/9). Took her to chiropractor, ran inside ahead of the mother, which felt strange. Aligned her hips/pelvis. Next day Sunday, still concerned about her hip. Went to ED that day for evaluation without significant findings on xray or physical exam. Apparently she has been going to doctors for months without anyone knowing regarding various ailments. Noticed that pt did not sleep much Monday night (asked to sleep in the same bed with her mom which was unusual for her). Unclear when the insomnia started as she spends most nights with boyfriend, whom she knows since childhood. Noticed pt not eating either, and had to be asked to eat. Wednesday she took her to ER in [facility], where they recommended outpatient CT for further work-up. In the ED, starts looking up various  symptoms worried about leukemia and other grave diseases. Gave her xanax in the ED and got some sleep. CT scan was scheduled for outpatient, which she did not follow-up with. Mother comes back home in Friday afternoon and found the patient reporting that she feels much better that God cured her. Noticed no javid. Earrings off. Praying all day and reading bible. Locked her room all day despite her mother asking her to come out. At last when she came out, she talked about "impurities" and that chiropractor is the "demon". Mother asked pt to go to ED but she continued to refuse. Father lives in [place] whom she reached out to help, but he was unable to help that weekend. Saturday pt was with boyfriend. On Sunday, pt was with aunt which is when they were able to get EMS to bring her to ED.     Quit school around COVID. [place] after the fact that she engaged in some NSSIB by way of cutting (forearm), that mother was unaware of until recently. 2021 she left to live independently in [place], doing her own thing. 1.5 years ago she came back. Knows patient to be meticulous about keeping her room clean at baseline room has been messier since she returned home to live with mother. More reserved. Spends most of her time with computer or with boyfriend. 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 Always going to the gym, everyday. She is health conscious. But not illness anxiety. Wonders if pt has body dysmorphia. She used to be heavier, but then got into health, working out. At some point, she cut off meat and when she ate meat, she would say that she felt not good and threw up later at night (this is hearsay from boyfriend).  Called mother (Gilberto Lind): [phone]    mother first noticed odd things starting last Saturday (12/9). Took her to chiropractor, ran inside ahead of the mother, which felt strange. Aligned her hips/pelvis. Next day Sunday, still concerned about her hip. Went to ED that day for evaluation without significant findings on xray or physical exam. Apparently she has been going to doctors for months without anyone knowing regarding various ailments. Noticed that pt did not sleep much Monday night (asked to sleep in the same bed with her mom which was unusual for her). Unclear when the insomnia started as she spends most nights with boyfriend, whom she knows since childhood. Noticed pt not eating either, and had to be asked to eat. Wednesday she took her to ER in [facility], where they recommended outpatient CT for further work-up. In the ED, starts looking up various  symptoms worried about leukemia and other grave diseases. Gave her xanax in the ED and got some sleep. CT scan was scheduled for outpatient, which she did not follow-up with. Mother comes back home in Friday afternoon and found the patient reporting that she feels much better that God cured her. Noticed no javid. Earrings off. Praying all day and reading bible. Locked her room all day despite her mother asking her to come out. At last when she came out, she talked about "impurities" and that chiropractor is the "demon". Mother asked pt to go to ED but she continued to refuse. Father lives in [place] whom she reached out to help, but he was unable to help that weekend. Saturday pt was with boyfriend. On Sunday, pt was with aunt which is when they were able to get EMS to bring her to ED.     Quit school around COVID. [place] after the fact that she engaged in some NSSIB by way of cutting (forearm), that mother was unaware of until recently. 2021 she left to live independently in [place], doing her own thing. 1.5 years ago she came back. Knows patient to be meticulous about keeping her room clean at baseline room has been messier since she returned home to live with mother. More reserved. Spends most of her time with computer or with boyfriend. 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 Always going to the gym, everyday. She is health conscious. But not illness anxiety. Wonders if pt has body dysmorphia. She used to be heavier, but then got into health, working out. At some point, she cut off meat and when she ate meat, she would say that she felt not good and threw up later at night (this is hearsay from boyfriend).

## 2023-12-19 NOTE — BH INPATIENT PSYCHIATRY PROGRESS NOTE - NSBHCHARTREVIEWLAB_PSY_A_CORE FT
Complete Blood Count + Automated Diff (12.19.23 @ 09:00)   IANC: 2.61: IANC (instrument absolute neutrophil count) is based on the instrument   calculation which may differ from ANC (manual absolute neutrophil count)   since it is based on the calculation from a manual differential. K/uL  WBC Count: 4.14 K/uL  RBC Count: 3.93 M/uL  Hemoglobin: 9.0 g/dL  Hematocrit: 30.1 %  Mean Cell Volume: 76.6 fL  Mean Cell Hemoglobin: 22.9 pg  Mean Cell Hemoglobin Conc: 29.9 gm/dL  Red Cell Distrib Width: 17.8 %  Platelet Count - Automated: 432 K/uL  Auto Neutrophil #: 2.61 K/uL  Auto Lymphocyte #: 1.08 K/uL  Auto Monocyte #: 0.26 K/uL  Auto Eosinophil #: 0.07 K/uL  Auto Basophil #: 0.10 K/uL  Auto Neutrophil %: 63.0 Complete Blood Count + Automated Diff (12.19.23 @ 09:00)   IANC: 2.61: IANC (instrument absolute neutrophil count) is based on the instrument   calculation which may differ from ANC (manual absolute neutrophil count)   since it is based on the calculation from a manual differential. K/uL  WBC Count: 4.14 K/uL  RBC Count: 3.93 M/uL  Hemoglobin: 9.0 g/dL  Hematocrit: 30.1 %  Mean Cell Volume: 76.6 fL  Mean Cell Hemoglobin: 22.9 pg  Mean Cell Hemoglobin Conc: 29.9 gm/dL  Red Cell Distrib Width: 17.8 %  Platelet Count - Automated: 432 K/uL  Auto Neutrophil #: 2.61 K/uL  Auto Lymphocyte #: 1.08 K/uL  Auto Monocyte #: 0.26 K/uL  Auto Eosinophil #: 0.07 K/uL  Auto Basophil #: 0.10 K/uL  Auto Neutrophil %: 63.0    Utox + BZ, THC

## 2023-12-19 NOTE — BH INPATIENT PSYCHIATRY PROGRESS NOTE - NSBHASSESSSUMMFT_PSY_ALL_CORE
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    Diagnostic impression on admission is bipolar I or II disorder. r/o psychosis.    Today overall, she continues to be calm and cooperative with the interview. Though reported behavior and symptoms from collateral history indicate an episode of edda, on exam patient does not seem manic. MSE notable only for poor eye contact and constricted, flat affect that verges on being blunted. Continuing to deny or downplay her behaviors that led up to her admission. Some guardedness as patient insists that she was not praying despite being observed by the writer directly. Her refusal of iron supplementation may be an evidence of grandiose thinking. Patient currently refusing all medications including abilify and iron supplements. Sleep and appetite has improved. Will need continued monitoring for any re-emergence of symptoms. Appropriate to continue abilify trial that was started in the ED.     Patient needs continued hospitalization for management of manic symptoms that impair patient's ability to care for self.     Plan:  1. Legal: Admitted on/continue 9.27 status  2. Safety: No reported SI/SIB/HI/VI currently on unit; continue routine observation.  	-Haldol/Ativan/Benadryl PRN medications for safety/agitation  3. Psychiatric:  	- ARIPIPRAZOLE trial: continue 5mg po daily for edda >> patient refused morning dose today  4. Therapy: group & milieu therapy  5. Medical:   	# microcytic anemia  - continue home ferrous sulfate; repeat lab on 12/19 Hgb 9 (admission Hgb 8.6)  	# re-order Utox  6. Collateral: Collateral from boyfriend (ED); collateral from mother (12/19)  7. 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    Diagnostic impression on admission is bipolar I or II disorder. r/o psychosis.    Today overall, she continues to be calm and cooperative with the interview. Though reported behavior and symptoms from collateral history indicate an episode of edda, on exam patient does not seem manic. MSE notable only for poor eye contact and constricted, flat affect that verges on being blunted. Continuing to deny or downplay her behaviors that led up to her admission. Some guardedness as patient insists that she was not praying despite being observed by the writer directly. Her refusal of iron supplementation may be an evidence of grandiose thinking and/or odd beliefs about health/illness. Patient currently refusing all medications including abilify and iron supplements. Sleep and appetite has improved. Will need continued monitoring for any re-emergence of symptoms. Appropriate to continue abilify trial that was started in the ED.     Patient needs continued hospitalization for management of manic symptoms that impair patient's ability to care for self.     Plan:  1. Legal: Admitted on/continue 9.27 status  2. Safety: No reported SI/SIB/HI/VI currently on unit; continue routine observation.  	-Haldol/Ativan/Benadryl PRN medications for safety/agitation  3. Psychiatric:  	- ARIPIPRAZOLE trial: continue 5mg po daily for edda >> patient refused morning dose today  4. Therapy: group & milieu therapy  5. Medical:   	# microcytic anemia  - continue home ferrous sulfate; repeat lab on 12/19 Hgb 9 (admission Hgb 8.6)  	# Utox + Bz and THC  6. Collateral: Collateral from boyfriend (ED); collateral from mother (12/19)  7. Disposition: When stable/pending clinical improvement

## 2023-12-19 NOTE — BH INPATIENT PSYCHIATRY PROGRESS NOTE - NSBHCHARTREVIEWVS_PSY_A_CORE FT
Vital Signs Last 24 Hrs  T(C): 36.7 (12-19-23 @ 07:50), Max: 37 (12-18-23 @ 14:01)  T(F): 98 (12-19-23 @ 07:50), Max: 98.6 (12-18-23 @ 14:01)  HR: --  BP: --  BP(mean): --  RR: 15 (12-19-23 @ 07:50) (15 - 20)  SpO2: --    Orthostatic VS  12-19-23 @ 07:50  Lying BP: --/-- HR: --  Sitting BP: 110/72 HR: 94  Standing BP: 110/71 HR: 100  Site: --  Mode: --  Orthostatic VS  12-18-23 @ 14:01  Lying BP: --/-- HR: --  Sitting BP: 138/81 HR: 116  Standing BP: 143/96 HR: 124  Site: --  Mode: --   Vital Signs Last 24 Hrs  T(C): 36.7 (12-19-23 @ 07:50), Max: 36.7 (12-19-23 @ 07:50)  T(F): 98 (12-19-23 @ 07:50), Max: 98 (12-19-23 @ 07:50)  HR: --  BP: --  BP(mean): --  RR: 15 (12-19-23 @ 07:50) (15 - 15)  SpO2: --    Orthostatic VS  12-19-23 @ 07:50  Lying BP: --/-- HR: --  Sitting BP: 110/72 HR: 94  Standing BP: 110/71 HR: 100  Site: --  Mode: --  Orthostatic VS  12-18-23 @ 14:01  Lying BP: --/-- HR: --  Sitting BP: 138/81 HR: 116  Standing BP: 143/96 HR: 124  Site: --  Mode: --

## 2023-12-19 NOTE — BH INPATIENT PSYCHIATRY PROGRESS NOTE - NSBHFUPINTERVALHXFT_PSY_A_CORE
Chart reviewed. Case d/w interdisciplinary team. Patient seen and examined. No acute overnight events, no PRNs required/requested. Refused standing medications this AM--ability and iron supplement. No physical complaints.     When approached for rounds, patient was sitting alone in her room and appeared to be praying (eyes closed, hands together, mumbling words). When the writer was able to grab her attention, she denied praying and said that she was looking at unit schedule. During interview, she reports feeling calm and collected. Mood is "really good, actually" and she offers no complaints. When challenged about events that led to admission, she denies engaging in odd behaviors and provides explanations behind actions documented in ED assessment note and provided by collateral. She explains that cleaning her room was not excessive, did not throw out valuable/meaningful items, said things to EMS things in retaliation for being manhandled etc. She has not noticed issues with her thought or behavior. She does not think that she is going through edda, and is not interested in taking medications. She also adds that she is not interested in iron supplements, even though she is aware of anemia. She feels that the level will increase without meds, because her body is healing and wants to give it time to "equilibrate". Denies racing thoughts, flight of ideas. Denies referential thinking, delusions, or hallucinations. No physical complaints.     Sleep throughout the night. Ate breakfast. Good energy level. No behavioral issues in the unit.

## 2023-12-19 NOTE — BH INPATIENT PSYCHIATRY PROGRESS NOTE - NSBHATTESTBILLING_PSY_A_CORE
24063-Antbaparus OBS or IP - moderate complexity OR 35-49 mins 41256-Bihbxfvmyz OBS or IP - moderate complexity OR 35-49 mins

## 2023-12-19 NOTE — BH INPATIENT PSYCHIATRY PROGRESS NOTE - NSBHMETABOLIC_PSY_ALL_CORE_FT
BMI:   HbA1c:   Glucose:   BP: 115/73 (12-18-23 @ 07:56) (102/70 - 149/103)Vital Signs Last 24 Hrs  T(C): 36.7 (12-19-23 @ 07:50), Max: 37 (12-18-23 @ 14:01)  T(F): 98 (12-19-23 @ 07:50), Max: 98.6 (12-18-23 @ 14:01)  HR: --  BP: --  BP(mean): --  RR: 15 (12-19-23 @ 07:50) (15 - 20)  SpO2: --    Orthostatic VS  12-19-23 @ 07:50  Lying BP: --/-- HR: --  Sitting BP: 110/72 HR: 94  Standing BP: 110/71 HR: 100  Site: --  Mode: --  Orthostatic VS  12-18-23 @ 14:01  Lying BP: --/-- HR: --  Sitting BP: 138/81 HR: 116  Standing BP: 143/96 HR: 124  Site: --  Mode: --    Lipid Panel:  BMI:   HbA1c:   Glucose:   BP: 115/73 (12-18-23 @ 07:56) (102/70 - 149/103)Vital Signs Last 24 Hrs  T(C): 36.7 (12-19-23 @ 07:50), Max: 36.7 (12-19-23 @ 07:50)  T(F): 98 (12-19-23 @ 07:50), Max: 98 (12-19-23 @ 07:50)  HR: --  BP: --  BP(mean): --  RR: 15 (12-19-23 @ 07:50) (15 - 15)  SpO2: --    Orthostatic VS  12-19-23 @ 07:50  Lying BP: --/-- HR: --  Sitting BP: 110/72 HR: 94  Standing BP: 110/71 HR: 100  Site: --  Mode: --  Orthostatic VS  12-18-23 @ 14:01  Lying BP: --/-- HR: --  Sitting BP: 138/81 HR: 116  Standing BP: 143/96 HR: 124  Site: --  Mode: --    Lipid Panel:

## 2023-12-19 NOTE — BH INPATIENT PSYCHIATRY PROGRESS NOTE - NSBHATTESTCOMMENTATTENDFT_PSY_A_CORE
Today on exam patient is calm and cooperative but with significant blunted affect and lack of eye contact. She superficially denies all symptoms, refutes all concerns voiced by Mom and boyfriend and feels no medications are currently indicated. Also declining home iron supplement as she thinks anemia will get better over time and with diet, making supplements not needed. Presents as irritable and odd towards end of interaction.    Collateral from family concerning for edda, however now patient presenting as odd, flat and with no eye contact rather than clear s/s of edda. Continue observation, offer abilify for edda and/or psychosis, and provide psychoeducation.

## 2023-12-20 PROCEDURE — 99232 SBSQ HOSP IP/OBS MODERATE 35: CPT | Mod: GC

## 2023-12-20 PROCEDURE — 90853 GROUP PSYCHOTHERAPY: CPT

## 2023-12-20 NOTE — BH INPATIENT PSYCHIATRY PROGRESS NOTE - NSBHMETABOLIC_PSY_ALL_CORE_FT
BMI:   HbA1c:   Glucose:   BP: 115/73 (12-18-23 @ 07:56) (102/70 - 149/103)Vital Signs Last 24 Hrs  T(C): 36.8 (12-20-23 @ 07:50), Max: 36.8 (12-20-23 @ 07:50)  T(F): 98.3 (12-20-23 @ 07:50), Max: 98.3 (12-20-23 @ 07:50)  HR: --  BP: --  BP(mean): --  RR: 15 (12-20-23 @ 07:50) (15 - 15)  SpO2: --    Orthostatic VS  12-20-23 @ 07:50  Lying BP: --/-- HR: --  Sitting BP: 118/81 HR: 88  Standing BP: 114/78 HR: 105  Site: --  Mode: --  Orthostatic VS  12-19-23 @ 07:50  Lying BP: --/-- HR: --  Sitting BP: 110/72 HR: 94  Standing BP: 110/71 HR: 100  Site: --  Mode: --  Orthostatic VS  12-18-23 @ 14:01  Lying BP: --/-- HR: --  Sitting BP: 138/81 HR: 116  Standing BP: 143/96 HR: 124  Site: --  Mode: --    Lipid Panel:  BMI:   HbA1c:   Glucose:   BP: 115/73 (12-18-23 @ 07:56) (115/73 - 116/61)Vital Signs Last 24 Hrs  T(C): 37 (12-20-23 @ 20:55), Max: 37 (12-20-23 @ 20:55)  T(F): 98.6 (12-20-23 @ 20:55), Max: 98.6 (12-20-23 @ 20:55)  HR: --  BP: --  BP(mean): --  RR: 15 (12-20-23 @ 07:50) (15 - 15)  SpO2: --    Orthostatic VS  12-20-23 @ 07:50  Lying BP: --/-- HR: --  Sitting BP: 118/81 HR: 88  Standing BP: 114/78 HR: 105  Site: --  Mode: --  Orthostatic VS  12-19-23 @ 07:50  Lying BP: --/-- HR: --  Sitting BP: 110/72 HR: 94  Standing BP: 110/71 HR: 100  Site: --  Mode: --    Lipid Panel:

## 2023-12-20 NOTE — BH INPATIENT PSYCHIATRY PROGRESS NOTE - NSBHCHARTREVIEWVS_PSY_A_CORE FT
Vital Signs Last 24 Hrs  T(C): 36.8 (12-20-23 @ 07:50), Max: 36.8 (12-20-23 @ 07:50)  T(F): 98.3 (12-20-23 @ 07:50), Max: 98.3 (12-20-23 @ 07:50)  HR: --  BP: --  BP(mean): --  RR: 15 (12-20-23 @ 07:50) (15 - 15)  SpO2: --    Orthostatic VS  12-20-23 @ 07:50  Lying BP: --/-- HR: --  Sitting BP: 118/81 HR: 88  Standing BP: 114/78 HR: 105  Site: --  Mode: --  Orthostatic VS  12-19-23 @ 07:50  Lying BP: --/-- HR: --  Sitting BP: 110/72 HR: 94  Standing BP: 110/71 HR: 100  Site: --  Mode: --  Orthostatic VS  12-18-23 @ 14:01  Lying BP: --/-- HR: --  Sitting BP: 138/81 HR: 116  Standing BP: 143/96 HR: 124  Site: --  Mode: --   Vital Signs Last 24 Hrs  T(C): 37 (12-20-23 @ 20:55), Max: 37 (12-20-23 @ 20:55)  T(F): 98.6 (12-20-23 @ 20:55), Max: 98.6 (12-20-23 @ 20:55)  HR: --  BP: --  BP(mean): --  RR: 15 (12-20-23 @ 07:50) (15 - 15)  SpO2: --    Orthostatic VS  12-20-23 @ 07:50  Lying BP: --/-- HR: --  Sitting BP: 118/81 HR: 88  Standing BP: 114/78 HR: 105  Site: --  Mode: --  Orthostatic VS  12-19-23 @ 07:50  Lying BP: --/-- HR: --  Sitting BP: 110/72 HR: 94  Standing BP: 110/71 HR: 100  Site: --  Mode: --

## 2023-12-20 NOTE — BH INPATIENT PSYCHIATRY PROGRESS NOTE - NSBHATTESTCOMMENTATTENDFT_PSY_A_CORE
Patient continues to present as odd with flat affect on the unit but otherwise no clear s/s of edda. Largely isolative to room, but caring for ADLs. Expand collateral to learn more re: baseline and continue to offer abilify. Rest of plan as above.

## 2023-12-20 NOTE — PSYCHIATRIC REHAB INITIAL EVALUATION - NSBHALCSUBCHOICE_PSY_ALL_CORE
Per chart, patient reports substance use in the context of Cannabis. Patient reports "occasional, random" use

## 2023-12-20 NOTE — BH INPATIENT PSYCHIATRY PROGRESS NOTE - NSBHFUPINTERVALHXFT_PSY_A_CORE
Chart reviewed. Case d/w interdisciplinary team. Patient seen and examined. No acute overnight events, no PRNs required/requested. Refused standing medications this AM--abilify and iron supplement. No physical complaints. Per staff, no odd or disorganized behaviors.    On interview today, patient reports being in "very good" mood. When asked about reasons for refusing meds, she responds that she is not interested in any medications. She follows with outpatient hematologist for anemia but has not seen for several months--feels that iron pills have minimally helped her. Wants to work on her diet to improve iron intake and other nutrients. Asks for nutrition consult. Spending time relaxing mostly and mentions how "calming" the milieu is to her. Slept well last night. No issues with appetite--ate three meals yesterday. Energy level is "decent". Thoughts are clear without racing or flights of ideas. Does not find her thought to be loud.      she reports feeling calm and collected. Mood is "really good, actually" and she offers no complaints. When challenged about events that led to admission, she denies engaging in odd behaviors and provides explanations behind actions documented in ED assessment note and provided by collateral. She explains that cleaning her room was not excessive, did not throw out valuable/meaningful items, said things to EMS things in retaliation for being manhandled etc. She has not noticed issues with her thought or behavior. She does not think that she is going through edda, and is not interested in taking medications. She also adds that she is not interested in iron supplements, even though she is aware of anemia. She feels that the level will increase without meds, because her body is healing and wants to give it time to "equilibrate". Denies racing thoughts, flight of ideas. Denies referential thinking, delusions, or hallucinations. No physical complaints.     Denies SIIP and HIIP. No behavioral issues in the unit.  Chart reviewed. Case d/w interdisciplinary team. Patient seen and examined. No acute overnight events, no PRNs required/requested. Refused standing medications this AM--abilify and iron supplement. No physical complaints. Per staff, no odd or disorganized behaviors.    On interview today, patient reports being in "very good" mood. When asked about reasons for refusing meds, she responds that she is not interested in any medications. She follows with outpatient hematologist for anemia but has not seen for several months--feels that iron pills have minimally helped her. Wants to work on her diet to improve iron intake and other nutrients. Asks for nutrition consult. Spending time relaxing mostly and mentions how "calming" the milieu is to her. Slept well last night. No issues with appetite--ate three meals yesterday. Energy level is "decent". Thoughts are clear without racing or flights of ideas. Does not find her thought to be loud.      Denies SIIP and HIIP.

## 2023-12-20 NOTE — PSYCHIATRIC REHAB INITIAL EVALUATION - NSBHEDUCURSCHOOL_PSY_ALL_CORE
Patient is currently on a leave of absence from [facility]/No Patient is currently on a leave of absence from [facility]/No

## 2023-12-20 NOTE — BH INPATIENT PSYCHIATRY PROGRESS NOTE - MSE UNSTRUCTURED FT
The patient appears stated age, fair hygiene and dressed appropriately.    The patient was calm, cooperative with the interview and but overall flat relatedness. Minimal eye contact, looking straight ahead.   No psychomotor agitation or retardation noted.  Steady gait observed.    The patient's speech was fluent, normal in tone and volume. Not pressured.   The patient's mood is "very good".  Affect is flat, constricted, and somewhat blunted. Mood incongruent.  Thought process is overall linear and goal directed.  Denies any delusions or hallucinations. Denies any suicidal or homicidal ideation, intent, or plan.    Insight is poor.  Judgment is poor to fair.  Impulse control has been fair on the unit.

## 2023-12-20 NOTE — BH INPATIENT PSYCHIATRY PROGRESS NOTE - NSBHATTESTBILLING_PSY_A_CORE
22190-Zfzrorcrrh OBS or IP - moderate complexity OR 35-49 mins 35227-Ueilvlxtaj OBS or IP - moderate complexity OR 35-49 mins

## 2023-12-20 NOTE — BH INPATIENT PSYCHIATRY PROGRESS NOTE - NSBHASSESSSUMMFT_PSY_ALL_CORE
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    Diagnostic impression on admission is bipolar I or II disorder. r/o psychosis.    Today overall, she continues to be calm and cooperative with the interview. Though reported behavior and symptoms from collateral history indicate an episode of edda, on exam patient does not seem manic. MSE notable only for poor eye contact and constricted, flat affect that verges on being blunted. Her refusal of iron supplementation may be an evidence of grandiose thinking and/or odd beliefs about health/illness. Improvement in behavior and MSE may reflect the effects of PRN medications in ED and improved sleep. Patient currently refusing all medications including abilify and iron supplements. Will need continued monitoring for any re-emergence of symptoms. Appropriate to continue abilify trial that was started in the ED. Will expand collateral to get baseline personality and behaviors.    Patient needs continued hospitalization for management of manic symptoms that impair patient's ability to care for self.     Plan:  1. Legal: Admitted on/continue 9.27 status  2. Safety: No reported SI/SIB/HI/VI currently on unit; continue routine observation.  	-Haldol/Ativan/Benadryl PRN medications for safety/agitation  3. Psychiatric:  	- ARIPIPRAZOLE trial: continue 5mg po daily for edda >> patient refused morning dose today  4. Therapy: group & milieu therapy  5. Medical:   	# microcytic anemia  - continue home ferrous sulfate; repeat lab on 12/19 Hgb 9 (admission Hgb 8.6)  	# Utox + Bz and THC  6. Collateral: Collateral from boyfriend (ED); collateral from mother (12/19)  7. 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    Diagnostic impression on admission is bipolar I or II disorder. r/o psychosis.    Today overall, she continues to be calm and cooperative with the interview. Though reported behavior and symptoms from collateral history indicate an episode of edda, on exam patient does not seem manic. MSE notable only for poor eye contact and constricted, flat affect that verges on being blunted. Her refusal of iron supplementation may be an evidence of grandiose thinking and/or odd beliefs about health/illness. Improvement in behavior and MSE may reflect the effects of PRN medications in ED and improved sleep. Patient currently refusing all medications including abilify and iron supplements. Will need continued monitoring for any re-emergence of symptoms. Appropriate to continue abilify trial that was started in the ED. Will expand collateral to get baseline personality and behaviors.    Patient needs continued hospitalization for management of manic symptoms that impair patient's ability to care for self.     Plan:  1. Legal: Admitted on/continue 9.27 status  2. Safety: No reported SI/SIB/HI/VI currently on unit; continue routine observation.  	-Haldol/Ativan/Benadryl PRN medications for safety/agitation  3. Psychiatric:  	- ARIPIPRAZOLE trial: continue 5mg po daily for edda >> patient refused morning dose today  4. Therapy: group & milieu therapy  5. Medical:   	# microcytic anemia  - continue home ferrous sulfate; repeat lab on 12/19 Hgb 9 (admission Hgb 8.6)  	# Utox + Bz and THC  6. Collateral: Collateral from boyfriend (ED); collateral from mother (12/19)  7. Disposition: When stable/pending clinical improvement

## 2023-12-20 NOTE — PSYCHIATRIC REHAB INITIAL EVALUATION - NSBHEDULEVEL_PSY_ALL_CORE
Patient is currently on a leave of absence from [facility]/Bachelor's Degree Patient is currently on a leave of absence from [facility]/Bachelor's Degree

## 2023-12-21 PROCEDURE — 99232 SBSQ HOSP IP/OBS MODERATE 35: CPT | Mod: GC

## 2023-12-21 PROCEDURE — 90853 GROUP PSYCHOTHERAPY: CPT

## 2023-12-21 NOTE — BH INPATIENT PSYCHIATRY PROGRESS NOTE - NSBHMETABOLIC_PSY_ALL_CORE_FT
BMI:   HbA1c:   Glucose:   BP: --Vital Signs Last 24 Hrs  T(C): 36.6 (12-21-23 @ 06:41), Max: 37 (12-20-23 @ 20:55)  T(F): 97.8 (12-21-23 @ 06:41), Max: 98.6 (12-20-23 @ 20:55)  HR: --  BP: --  BP(mean): --  RR: 18 (12-21-23 @ 06:41) (18 - 18)  SpO2: --    Orthostatic VS  12-21-23 @ 06:41  Lying BP: --/-- HR: --  Sitting BP: 109/73 HR: 80  Standing BP: 113/80 HR: 100  Site: --  Mode: --  Orthostatic VS  12-20-23 @ 07:50  Lying BP: --/-- HR: --  Sitting BP: 118/81 HR: 88  Standing BP: 114/78 HR: 105  Site: --  Mode: --    Lipid Panel:

## 2023-12-21 NOTE — BH INPATIENT PSYCHIATRY PROGRESS NOTE - NSBHATTESTCOMMENTATTENDFT_PSY_A_CORE
Today on exam patient is focused on being distressed and distracted by music on the unit and asking for ear plugs. Of note patient seen by staff to have paper in her ears as ear plugs even when there was no music or noise outside her room. Pt is isolative ot her rom, reading, writing biblical writings and praying. Only coming out for meals. She denies any manic or psychotic symptoms but affect remains flat.     Collateral from Mom more concerning for potential prodromal process. Pt still declining medications, may not have criteria to bring her for medication over objection but will continue to observe. Continue rest of plan as above.

## 2023-12-21 NOTE — BH INPATIENT PSYCHIATRY PROGRESS NOTE - NSBHCHARTREVIEWVS_PSY_A_CORE FT
Vital Signs Last 24 Hrs  T(C): 36.6 (12-21-23 @ 06:41), Max: 37 (12-20-23 @ 20:55)  T(F): 97.8 (12-21-23 @ 06:41), Max: 98.6 (12-20-23 @ 20:55)  HR: --  BP: --  BP(mean): --  RR: 18 (12-21-23 @ 06:41) (18 - 18)  SpO2: --    Orthostatic VS  12-21-23 @ 06:41  Lying BP: --/-- HR: --  Sitting BP: 109/73 HR: 80  Standing BP: 113/80 HR: 100  Site: --  Mode: --  Orthostatic VS  12-20-23 @ 07:50  Lying BP: --/-- HR: --  Sitting BP: 118/81 HR: 88  Standing BP: 114/78 HR: 105  Site: --  Mode: --

## 2023-12-21 NOTE — DIETITIAN INITIAL EVALUATION ADULT - ORAL INTAKE PTA/DIET HISTORY
Patient reports good appetite and PO intake at meals, used to take iron supplements at home but not recently, wishes to move to a plant-based diet and incorporate iron-rich foods in her diet due to hx of anemia.

## 2023-12-21 NOTE — BH INPATIENT PSYCHIATRY PROGRESS NOTE - MSE UNSTRUCTURED FT
The patient appears stated age, fair hygiene and dressed appropriately.    The patient was calm, cooperative with the interview and but overall flat relatedness. Minimal eye contact, looking straight ahead.   No psychomotor agitation or retardation noted.  Steady gait observed.    The patient's speech was fluent and normal in tone. Soft spoken but not difficult to comprehend. Not pressured.   The patient's mood is "very good".  Affect is flat, constricted, and somewhat blunted. Mood incongruent.  Thought process is overall linear and goal directed.  Denies any delusions or hallucinations. Denies any suicidal or homicidal ideation, intent, or plan.    Insight is poor.  Judgment is poor to fair.  Impulse control has been fair on the unit.

## 2023-12-21 NOTE — DIETITIAN INITIAL EVALUATION ADULT - ADD RECOMMEND
c/w ferrous sulfate as per MD order. Encourage adequate po intake as tolerated and honor food preferences PRN to improve iron intake. Monitor PO intake/tolerance, weights, labs, BM's, and skin integrity.

## 2023-12-21 NOTE — BH CHART NOTE - NSEVENTNOTEFT_PSY_ALL_CORE
Called mother (Gilberto Lind): [phone]    Mother describes patient as a "gracious" child. Does not like to show her emotions, put together and in control of them. Friday before admission, mother knew something was wrong as she was all smiling, too cheerful; which is very unlike her. Since then, she has had three conversations with the patient. Over the phone, she sounded not herself on the first day Monday ("hear a bit of manic"). Second day on Tuesday she sounded a bit more herself. Last call with patient was yesterday (12/20), which she did not sound herself. Mother noticed a difference in speech tone, as well as repeating certain words/phrases.     As for the religiousness. Mother reports that she is a Buddhism and raised pt with its teachings. At age 18, pt no longer wanted to be part of it. Reported her mother that she is joining another Confucianist / Zoroastrian group; the details of which is not known to the mother. Then she went to college, lived off campus away from home. She did not hear of Zoroastrian subjects or seen her engaged in Zoroastrian activity until last Friday, when pt was suddenly praying and reading the bible (mother mentions that it was not King Ivan kristen.). Mother feels patient is currently "overly Zoroastrian".     Before college, mother describes pt as a "normal child". Somewhat reserved but going out with friend to movies etc. She used to get straight As in high school and lived with a book by her side. Last year of high school--something changed. Grade was dropping, and teachers all wondered why that may be. Around this time was when mother found out that she had been cutting. 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 Changed major to business related major without a clear reason. After she came back to live with mother last year, she did not spend a lot of time at home. And when she is home, she was mostly in her room. She would engage in brief superficial, normal conversation with the mother too. Mother notes that her room had been unkempt--bed was rarely made, boxes from when she moved in were left unpacked as she did not attend to them for a whole year. Also, she wanted the room to be painted red--pt's grandmother hated it and made a comment that it looks like blood everywhere. Red is not pt's favorite color. Mother kept trying to engage patient to return to school or get a job, but pt found it difficult to do so.     Mother feels that the boyfriend will be able to provide collateral of any other behavior changes that may be relevant. Called mother (Gilberto Lind): [phone]    Mother describes patient as a "gracious" child. Does not like to show her emotions, put together and in control of them. Friday before admission, mother knew something was wrong as she was all smiling, too cheerful; which is very unlike her. Since then, she has had three conversations with the patient. Over the phone, she sounded not herself on the first day Monday ("hear a bit of manic"). Second day on Tuesday she sounded a bit more herself. Last call with patient was yesterday (12/20), which she did not sound herself. Mother noticed a difference in speech tone, as well as repeating certain words/phrases.     As for the religiousness. Mother reports that she is a Faith and raised pt with its teachings. At age 18, pt no longer wanted to be part of it. Reported her mother that she is joining another Catholic / Orthodoxy group; the details of which is not known to the mother. Then she went to college, lived off campus away from home. She did not hear of Orthodoxy subjects or seen her engaged in Orthodoxy activity until last Friday, when pt was suddenly praying and reading the bible (mother mentions that it was not King Ivan kristen.). Mother feels patient is currently "overly Orthodoxy".     Before college, mother describes pt as a "normal child". Somewhat reserved but going out with friend to movies etc. She used to get straight As in high school and lived with a book by her side. Last year of high school--something changed. Grade was dropping, and teachers all wondered why that may be. Around this time was when mother found out that she had been cutting. 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 Changed major to business related major without a clear reason. After she came back to live with mother last year, she did not spend a lot of time at home. And when she is home, she was mostly in her room. She would engage in brief superficial, normal conversation with the mother too. Mother notes that her room had been unkempt--bed was rarely made, boxes from when she moved in were left unpacked as she did not attend to them for a whole year. Also, she wanted the room to be painted red--pt's grandmother hated it and made a comment that it looks like blood everywhere. Red is not pt's favorite color. Mother kept trying to engage patient to return to school or get a job, but pt found it difficult to do so.     Mother feels that the boyfriend will be able to provide collateral of any other behavior changes that may be relevant.

## 2023-12-21 NOTE — DIETITIAN INITIAL EVALUATION ADULT - OTHER INFO
Patient is a 22 y/o female with no PPH or inpatient admission, no previous psych meds or treatment, no previous SI or suicide attempts, no NSSIB, no legal issues, no substance use, PMH of anemia and fibroid, BIBEMS for disorganized behaviors at home.    Met with patient in her room today. Patient reports her current appetite remains good with good PO intake on the unit, denies chewing/swallowing difficulties on current diet. Allergic to fish. Ferrous sulfate ordered per MD for anemia but patient prefers to acquire iron from foods. Writer provided verbal and written education on Iron Deficiency Anemia Nutrition Therapy to patient today and reviewed food items with moderate to high in iron. Patient verbalized understanding. Patient wishes to continue with current regular, no fish diet to increase her iron intake from foods. Patient denies GI distress (nausea/vomiting/diarrhea/ constipation) at this time. Reports UBW ~110-120lb over the year and denies significant wt changes PTA.

## 2023-12-21 NOTE — BH INPATIENT PSYCHIATRY PROGRESS NOTE - NSBHATTESTBILLING_PSY_A_CORE
[id number] OBS or IP - moderate complexity OR 35-49 mins [id number]-Hadcufuxra OBS or IP - moderate complexity OR 35-49 mins

## 2023-12-21 NOTE — BH INPATIENT PSYCHIATRY PROGRESS NOTE - NSBHASSESSSUMMFT_PSY_ALL_CORE
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    Diagnostic impression on admission is bipolar I or II disorder. r/o psychosis.    Today overall, she continues to be calm and cooperative with the interview. Continues to show no signs of over edda on exam, though reported behavior and symptoms from collateral history indicate otherwise. MSE notable for poor eye contact and constricted, flat affect that verges on blunted despite reported "very good" mood. Her isolation and engagement [employer] activities throughout the day are concerning but does not appear pathological at this moment. Also wondering whether her insistence on earplugs represents reaction to stimulus other than music from day room (maybe internal voice?). Patient currently refusing all medications including abilify and iron supplements. Will need continued monitoring for any re-emergence of symptoms. Appropriate to continue abilify trial that was started in the ED. Will expand collateral to get baseline personality and behaviors.    Patient needs continued hospitalization for management of manic symptoms that impair patient's ability to care for self.     Plan:  1. Legal: Admitted on/continue 9.27 status  2. Safety: No reported SI/SIB/HI/VI currently on unit; continue routine observation.  	-Haldol/Ativan/Benadryl PRN medications for safety/agitation  3. Psychiatric:  	- ARIPIPRAZOLE trial: continue 5mg po daily for edda >> patient refused morning dose today  4. Therapy: group & milieu therapy  5. Medical:   	# microcytic anemia  - continue home ferrous sulfate; repeat lab on 12/19 Hgb 9 (admission Hgb 8.6)  	# Utox + Bz and THC  6. Collateral: Collateral from boyfriend (ED); collateral from mother (12/19)  7. 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    Diagnostic impression on admission is bipolar I or II disorder. r/o psychosis.    Today overall, she continues to be calm and cooperative with the interview. Continues to show no signs of over edda on exam, though reported behavior and symptoms from collateral history indicate otherwise. MSE notable for poor eye contact and constricted, flat affect that verges on blunted despite reported "very good" mood. Her isolation and engagement Nondenominational activities throughout the day are concerning but does not appear pathological at this moment. Also wondering whether her insistence on earplugs represents reaction to stimulus other than music from day room (maybe internal voice?). Patient currently refusing all medications including abilify and iron supplements. Will need continued monitoring for any re-emergence of symptoms. Appropriate to continue abilify trial that was started in the ED. Will expand collateral to get baseline personality and behaviors.    Patient needs continued hospitalization for management of manic symptoms that impair patient's ability to care for self.     Plan:  1. Legal: Admitted on/continue 9.27 status  2. Safety: No reported SI/SIB/HI/VI currently on unit; continue routine observation.  	-Haldol/Ativan/Benadryl PRN medications for safety/agitation  3. Psychiatric:  	- ARIPIPRAZOLE trial: continue 5mg po daily for edda >> patient refused morning dose today  4. Therapy: group & milieu therapy  5. Medical:   	# microcytic anemia  - continue home ferrous sulfate; repeat lab on 12/19 Hgb 9 (admission Hgb 8.6)  	# Utox + Bz and THC  6. Collateral: Collateral from boyfriend (ED); collateral from mother (12/19)  7. 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    Diagnostic impression on admission is bipolar I or II disorder. r/o psychosis.    Today overall, she continues to be calm and cooperative with the interview. Continues to show no signs of over edda on exam, though reported behavior and symptoms from collateral history indicate otherwise. Further collateral history provided mother today provides more evidence to primary psychotic disorder underlying her behavior change. MSE notable for poor eye contact and constricted, flat affect that verges on blunted despite reported "very good" mood. Her isolation and engagement Restoration activities throughout the day appear to be hyperreligious. Also her insistence on earplugs may be secondary to internal stimulus other than music from day room. Appropriate to continue abilify trial that was started in the ED. Patient currently refusing all medications including abilify and iron supplements. Will need continued monitoring for any re-emergence of symptoms.     Patient needs continued hospitalization for management of manic symptoms that impair patient's ability to care for self.     Plan:  1. Legal: Admitted on/continue 9.27 status  2. Safety: No reported SI/SIB/HI/VI currently on unit; continue routine observation.  	-Haldol/Ativan/Benadryl PRN medications for safety/agitation  3. Psychiatric:  	- ARIPIPRAZOLE trial: continue 5mg po daily for edda >> patient refused morning dose today  4. Therapy: group & milieu therapy  5. Medical:   	# microcytic anemia  - continue home ferrous sulfate; repeat lab on 12/19 Hgb 9 (admission Hgb 8.6)  	# Utox + Bz and THC  6. Collateral: Collateral from boyfriend (ED); collateral from mother (12/19)  7. 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    Diagnostic impression on admission is bipolar I or II disorder. r/o psychosis.    Today overall, she continues to be calm and cooperative with the interview. Continues to show no signs of over edda on exam, though reported behavior and symptoms from collateral history indicate otherwise. Further collateral history provided mother today provides more evidence to primary psychotic disorder underlying her behavior change. MSE notable for poor eye contact and constricted, flat affect that verges on blunted despite reported "very good" mood. Her isolation and engagement Mandaeism activities throughout the day appear to be hyperreligious. Also her insistence on earplugs may be secondary to internal stimulus other than music from day room. Appropriate to continue abilify trial that was started in the ED. Patient currently refusing all medications including abilify and iron supplements. Will need continued monitoring for any re-emergence of symptoms.     Patient needs continued hospitalization for management of manic symptoms that impair patient's ability to care for self.     Plan:  1. Legal: Admitted on/continue 9.27 status  2. Safety: No reported SI/SIB/HI/VI currently on unit; continue routine observation.  	-Haldol/Ativan/Benadryl PRN medications for safety/agitation  3. Psychiatric:  	- ARIPIPRAZOLE trial: continue 5mg po daily for edda >> patient refused morning dose today  4. Therapy: group & milieu therapy  5. Medical:   	# microcytic anemia  - continue home ferrous sulfate; repeat lab on 12/19 Hgb 9 (admission Hgb 8.6)  	# Utox + Bz and THC  6. Collateral: Collateral from boyfriend (ED); collateral from mother (12/19)  7. 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    Diagnostic impression on admission is bipolar I or II disorder. r/o psychosis.    Today overall, she continues to be calm and cooperative with the interview. Continues to show no signs of over edda on exam, though reported behavior and symptoms from collateral history indicate otherwise. Further collateral history provided mother today provides more evidence of prodromal symptoms prior to her more recent acute behavior change. MSE notable for poor eye contact and constricted, flat affect that verges on blunted despite reported "very good" mood. Her isolation and engagement in [employer] activities throughout the day appear to be hyperreligious. Also her insistence on earplugs may be secondary to internal stimulus other than music from day room. Appropriate to continue abilify trial that was started in the ED. Patient currently refusing all medications including abilify and iron supplements. Will need continued monitoring for any re-emergence of symptoms.     Patient needs continued hospitalization for management of manic and potentially psychotic symptoms that impair patient's ability to care for self.     Plan:  1. Legal: Admitted on/continue 9.27 status  2. Safety: No reported SI/SIB/HI/VI currently on unit; continue routine observation.  	-Haldol/Ativan/Benadryl PRN medications for safety/agitation  3. Psychiatric:  	- ARIPIPRAZOLE trial: continue 5mg po daily for edda >> patient refused morning dose today  4. Therapy: group & milieu therapy  5. Medical:   	# microcytic anemia  - continue home ferrous sulfate; repeat lab on 12/19 Hgb 9 (admission Hgb 8.6)  	# Utox + Bz and THC  6. Collateral: Collateral from boyfriend (ED); collateral from mother (12/19)  7. 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    Diagnostic impression on admission is bipolar I or II disorder. r/o psychosis.    Today overall, she continues to be calm and cooperative with the interview. Continues to show no signs of over edda on exam, though reported behavior and symptoms from collateral history indicate otherwise. Further collateral history provided mother today provides more evidence of prodromal symptoms prior to her more recent acute behavior change. MSE notable for poor eye contact and constricted, flat affect that verges on blunted despite reported "very good" mood. Her isolation and engagement in Confucianism activities throughout the day appear to be hyperreligious. Also her insistence on earplugs may be secondary to internal stimulus other than music from day room. Appropriate to continue abilify trial that was started in the ED. Patient currently refusing all medications including abilify and iron supplements. Will need continued monitoring for any re-emergence of symptoms.     Patient needs continued hospitalization for management of manic and potentially psychotic symptoms that impair patient's ability to care for self.     Plan:  1. Legal: Admitted on/continue 9.27 status  2. Safety: No reported SI/SIB/HI/VI currently on unit; continue routine observation.  	-Haldol/Ativan/Benadryl PRN medications for safety/agitation  3. Psychiatric:  	- ARIPIPRAZOLE trial: continue 5mg po daily for edda >> patient refused morning dose today  4. Therapy: group & milieu therapy  5. Medical:   	# microcytic anemia  - continue home ferrous sulfate; repeat lab on 12/19 Hgb 9 (admission Hgb 8.6)  	# Utox + Bz and THC  6. Collateral: Collateral from boyfriend (ED); collateral from mother (12/19)  7. Disposition: When stable/pending clinical improvement

## 2023-12-21 NOTE — BH INPATIENT PSYCHIATRY PROGRESS NOTE - NSBHFUPINTERVALHXFT_PSY_A_CORE
Chart reviewed. Case d/w interdisciplinary team. Patient seen and examined. No acute overnight events, no PRNs required/requested. Refused standing medications this AM--abilify and iron supplement. No physical complaints. Per staff, no disorganized behaviors and patient mostly staying in her room staring out the window.    On interview today, patient reports being in "very good" mood. Only concern she offers is regarding loudness of music outside her room in the afternoons. Her room door faces dining room, where music can be playing during afternoon groups. Otherwise, she has been a bit bored but perfectly content in the unit. She has been in her room reading the bible, praying, and writing out bible verses.     Pt denies feeling that her symptoms leading up to admission reflects edda. Pt feels that discrepancy of her account of events from the concern her mother, aunt, and boyfriend shared is due to her Christianity beliefs. Reports experiencing becoming more devout since the summer. She had been less vocal to her mother as she is a Methodist and confronts pt about her Restorationism beliefs. Her boyfriend is not Christianity and shared his concern to her over summer when she became more Christianity. As a result has been less vocal and has not shown her religiosity externally until about week ago, when she found Pentecostal to be helpful in the time of physical pain. She is unsure what exactly made her feel better, but still thanks god for helping her through.     Slept well last night (around 8 hours). No issues with appetite--ate three meals yesterday. Energy level is "good". Thoughts are clear without racing or flights of ideas. Does not find her thought to be loud. Denies paranoia, ideas of reference, or grandiose delusions. Denies SIIP and HIIP.  Chart reviewed. Case d/w interdisciplinary team. Patient seen and examined. No acute overnight events, no PRNs required/requested. Refused standing medications this AM--abilify and iron supplement. No physical complaints. Per staff, no disorganized behaviors and patient mostly staying in her room staring out the window.    On interview today, patient reports being in "very good" mood. Only concern she offers is regarding loudness of music outside her room in the afternoons. Her room door faces dining room, where music can be playing during afternoon groups. Otherwise, she has been a bit bored but perfectly content in the unit. She has been in her room reading the bible, praying, and writing out bible verses.     Pt denies feeling that her symptoms leading up to admission reflects edda. Pt feels that discrepancy of her account of events from the concern her mother, aunt, and boyfriend shared is due to her Restorationist beliefs. Reports experiencing becoming more devout since the summer. She had been less vocal to her mother as she is a Buddhist and confronts pt about her Episcopal beliefs. Her boyfriend is not Restorationist and shared his concern to her over summer when she became more Restorationist. As a result has been less vocal and has not shown her religiosity externally until about week ago, when she found Anabaptist to be helpful in the time of physical pain. She is unsure what exactly made her feel better, but still thanks god for helping her through.     Slept well last night (around 8 hours). No issues with appetite--ate three meals yesterday. Energy level is "good". Thoughts are clear without racing or flights of ideas. Does not find her thought to be loud. Denies paranoia, ideas of reference, or grandiose delusions. Denies SIIP and HIIP.

## 2023-12-21 NOTE — DIETITIAN INITIAL EVALUATION ADULT - PERTINENT LABORATORY DATA
Comprehensive Metabolic Panel (12.17.23 @ 16:58)   Sodium: 139 mmol/L  Potassium: 3.9 mmol/L  Chloride: 103 mmol/L  Carbon Dioxide: 18 mmol/L  Anion Gap: 18 mmol/L  Blood Urea Nitrogen: 14 mg/dL  Creatinine: 0.69 mg/dL  Glucose: 96 mg/dL  Calcium: 9.3 mg/dL  Protein Total: 7.5 g/dL  Albumin: 4.3 g/dL  Bilirubin Total: 0.4 mg/dL  Alkaline Phosphatase: 56 U/L  Aspartate Aminotransferase (AST/SGOT): 31 U/L  Alanine Aminotransferase (ALT/SGPT): 14 U/L  eGFR: 125: The estimated glomerular filtration rate (eGFR) is calculated using the   2021 CKD-EPI creatinine equation, which does not have a coefficient for   race and is validated in individuals 18 years of age and older (N Engl J   Med 2021; 385:3095-3877). Creatinine-based eGFR may be inaccurate in   various situations including but not limited to extremes of muscle mass,   altered dietary protein intake, or medications that affect renal tubular   creatinine secretion. mL/min/1.73m2    Hemoglobin: 8.3 g/dL (12.17.23 @ 16:58)   Hematocrit: 27.6 % (12.17.23 @ 16:58)   Mean Cell Volume: 75.2 fL (12.17.23 @ 16:58)   Mean Cell Hemoglobin: 22.6 pg (12.17.23 @ 16:58)  Comprehensive Metabolic Panel (12.17.23 @ 16:58)   Sodium: 139 mmol/L  Potassium: 3.9 mmol/L  Chloride: 103 mmol/L  Carbon Dioxide: 18 mmol/L  Anion Gap: 18 mmol/L  Blood Urea Nitrogen: 14 mg/dL  Creatinine: 0.69 mg/dL  Glucose: 96 mg/dL  Calcium: 9.3 mg/dL  Protein Total: 7.5 g/dL  Albumin: 4.3 g/dL  Bilirubin Total: 0.4 mg/dL  Alkaline Phosphatase: 56 U/L  Aspartate Aminotransferase (AST/SGOT): 31 U/L  Alanine Aminotransferase (ALT/SGPT): 14 U/L  eGFR: 125: The estimated glomerular filtration rate (eGFR) is calculated using the   2021 CKD-EPI creatinine equation, which does not have a coefficient for   race and is validated in individuals 18 years of age and older (N Engl J   Med 2021; 385:4286-4666). Creatinine-based eGFR may be inaccurate in   various situations including but not limited to extremes of muscle mass,   altered dietary protein intake, or medications that affect renal tubular   creatinine secretion. mL/min/1.73m2    Hemoglobin: 8.3 g/dL (12.17.23 @ 16:58)   Hematocrit: 27.6 % (12.17.23 @ 16:58)   Mean Cell Volume: 75.2 fL (12.17.23 @ 16:58)   Mean Cell Hemoglobin: 22.6 pg (12.17.23 @ 16:58)

## 2023-12-22 PROCEDURE — 99232 SBSQ HOSP IP/OBS MODERATE 35: CPT | Mod: GC

## 2023-12-22 NOTE — BH INPATIENT PSYCHIATRY PROGRESS NOTE - NSBHATTESTBILLING_PSY_A_CORE
54898-Eoquzwoqah OBS or IP - moderate complexity OR 35-49 mins 88152-Jgzspuhkhd OBS or IP - moderate complexity OR 35-49 mins

## 2023-12-22 NOTE — BH INPATIENT PSYCHIATRY PROGRESS NOTE - NSBHCHARTREVIEWVS_PSY_A_CORE FT
Vital Signs Last 24 Hrs  T(C): 35.8 (12-22-23 @ 07:37), Max: 36.7 (12-21-23 @ 21:01)  T(F): 96.4 (12-22-23 @ 07:37), Max: 98.1 (12-21-23 @ 21:01)  HR: --  BP: --  BP(mean): --  RR: 16 (12-22-23 @ 07:37) (16 - 16)  SpO2: --    Orthostatic VS  12-22-23 @ 07:37  Lying BP: --/-- HR: --  Sitting BP: 114/81 HR: 67  Standing BP: 103/72 HR: 94  Site: --  Mode: --  Orthostatic VS  12-21-23 @ 06:41  Lying BP: --/-- HR: --  Sitting BP: 109/73 HR: 80  Standing BP: 113/80 HR: 100  Site: --  Mode: --

## 2023-12-22 NOTE — BH INPATIENT PSYCHIATRY PROGRESS NOTE - NSBHMETABOLIC_PSY_ALL_CORE_FT
BMI:   HbA1c:   Glucose:   BP: --Vital Signs Last 24 Hrs  T(C): 35.8 (12-22-23 @ 07:37), Max: 36.7 (12-21-23 @ 21:01)  T(F): 96.4 (12-22-23 @ 07:37), Max: 98.1 (12-21-23 @ 21:01)  HR: --  BP: --  BP(mean): --  RR: 16 (12-22-23 @ 07:37) (16 - 16)  SpO2: --    Orthostatic VS  12-22-23 @ 07:37  Lying BP: --/-- HR: --  Sitting BP: 114/81 HR: 67  Standing BP: 103/72 HR: 94  Site: --  Mode: --  Orthostatic VS  12-21-23 @ 06:41  Lying BP: --/-- HR: --  Sitting BP: 109/73 HR: 80  Standing BP: 113/80 HR: 100  Site: --  Mode: --    Lipid Panel:

## 2023-12-22 NOTE — BH INPATIENT PSYCHIATRY PROGRESS NOTE - NSBHASSESSSUMMFT_PSY_ALL_CORE
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    Diagnostic impression on admission is bipolar I or II disorder. r/o psychosis.    Today overall, she continues to show no signs of over edda on exam, though reported behavior and symptoms from collateral history indicate otherwise. MSE today notable for poor eye contact and overall flat affect that later became tearful when discussing Sikhism and mother. Further collateral history provided mother provides more evidence of prodromal symptoms prior to her more recent acute behavior change. Her isolation and engagement in Tenriism activities throughout the day appear to be hyperreligious. Appropriate to continue abilify trial that was started in the ED, though patient has been refusing all medications. No behavioral outbursts or odd/intrusive behavior in unit. Will need continued monitoring for any re-emergence of symptoms and proper diagnosis that led to behavior change.     Patient needs continued hospitalization for management of manic and potentially psychotic symptoms that impair patient's ability to care for self.     Plan:  1. Legal: Admitted on/continue 9.27 status  2. Safety: No reported SI/SIB/HI/VI currently on unit; continue routine observation.  	-Haldol/Ativan/Benadryl PRN medications for safety/agitation  3. Psychiatric:  	- ARIPIPRAZOLE trial: continue 5mg po daily for edda >> patient refused morning dose today  4. Therapy: group & milieu therapy  5. Medical:   	# microcytic anemia  - continue home ferrous sulfate; repeat lab on 12/19 Hgb 9 (admission Hgb 8.6)  	# Utox + Bz and THC  6. Collateral: Collateral from boyfriend (ED); collateral from mother (12/19)  7. 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    Diagnostic impression on admission is bipolar I or II disorder. r/o psychosis.    Today overall, she continues to show no signs of over edad on exam, though reported behavior and symptoms from collateral history indicate otherwise. MSE today notable for poor eye contact and overall flat affect that later became tearful when discussing Episcopalian and mother. Further collateral history provided mother provides more evidence of prodromal symptoms prior to her more recent acute behavior change. Her isolation and engagement in Jehovah's witness activities throughout the day appear to be hyperreligious. Appropriate to continue abilify trial that was started in the ED, though patient has been refusing all medications. No behavioral outbursts or odd/intrusive behavior in unit. Will need continued monitoring for any re-emergence of symptoms and proper diagnosis that led to behavior change.     Patient needs continued hospitalization for management of manic and potentially psychotic symptoms that impair patient's ability to care for self.     Plan:  1. Legal: Admitted on/continue 9.27 status  2. Safety: No reported SI/SIB/HI/VI currently on unit; continue routine observation.  	-Haldol/Ativan/Benadryl PRN medications for safety/agitation  3. Psychiatric:  	- ARIPIPRAZOLE trial: continue 5mg po daily for edda >> patient refused morning dose today  4. Therapy: group & milieu therapy  5. Medical:   	# microcytic anemia  - continue home ferrous sulfate; repeat lab on 12/19 Hgb 9 (admission Hgb 8.6)  	# Utox + Bz and THC  6. Collateral: Collateral from boyfriend (ED); collateral from mother (12/19)  7. 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    Diagnostic impression on admission is bipolar I or II disorder. r/o psychosis.    Today overall, pt continues to show no signs of over edda on exam, though reported behavior and symptoms from collateral history indicate otherwise. MSE today notable for poor eye contact and overall flat affect that later became tearful when discussing Adventist and mother. Further collateral history provided mother provides more evidence of prodromal symptoms prior to her more recent acute behavior change. Her isolation and engagement in Bahai activities throughout the day appear to be hyperreligious. Appropriate to continue abilify trial that was started in the ED, though patient has been refusing all medications. No behavioral outbursts or odd/intrusive behavior in unit. Will need continued monitoring for any re-emergence of symptoms and proper diagnosis that led to behavior change.     Patient needs continued hospitalization for management of manic and potentially psychotic symptoms that impair patient's ability to care for self.     Plan:  1. Legal: Admitted on/continue 9.27 status  2. Safety: No reported SI/SIB/HI/VI currently on unit; continue routine observation.  	-Haldol/Ativan/Benadryl PRN medications for safety/agitation  3. Psychiatric:  	- ARIPIPRAZOLE trial: continue 5mg po daily for edda >> patient refused morning dose today  4. Therapy: group & milieu therapy  5. Medical:   	# microcytic anemia  - continue home ferrous sulfate; repeat lab on 12/19 Hgb 9 (admission Hgb 8.6)  	# Utox + Bz and THC  6. Collateral: Collateral from boyfriend (ED); collateral from mother (12/19)  7. 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    Diagnostic impression on admission is bipolar I or II disorder. r/o psychosis.    Today overall, pt continues to show no signs of over edda on exam, though reported behavior and symptoms from collateral history indicate otherwise. MSE today notable for poor eye contact and overall flat affect that later became tearful when discussing Episcopalian and mother. Further collateral history provided mother provides more evidence of prodromal symptoms prior to her more recent acute behavior change. Her isolation and engagement in Mu-ism activities throughout the day appear to be hyperreligious. Appropriate to continue abilify trial that was started in the ED, though patient has been refusing all medications. No behavioral outbursts or odd/intrusive behavior in unit. Will need continued monitoring for any re-emergence of symptoms and proper diagnosis that led to behavior change.     Patient needs continued hospitalization for management of manic and potentially psychotic symptoms that impair patient's ability to care for self.     Plan:  1. Legal: Admitted on/continue 9.27 status  2. Safety: No reported SI/SIB/HI/VI currently on unit; continue routine observation.  	-Haldol/Ativan/Benadryl PRN medications for safety/agitation  3. Psychiatric:  	- ARIPIPRAZOLE trial: continue 5mg po daily for edda >> patient refused morning dose today  4. Therapy: group & milieu therapy  5. Medical:   	# microcytic anemia  - continue home ferrous sulfate; repeat lab on 12/19 Hgb 9 (admission Hgb 8.6)  	# Utox + Bz and THC  6. Collateral: Collateral from boyfriend (ED); collateral from mother (12/19)  7. Disposition: When stable/pending clinical improvement

## 2023-12-22 NOTE — BH INPATIENT PSYCHIATRY PROGRESS NOTE - MSE UNSTRUCTURED FT
The patient appears stated age, fair hygiene and dressed appropriately.    The patient was calm, cooperative with the interview and but overall flat relatedness. Minimal eye contact, looking straight ahead.   No psychomotor agitation or retardation noted.  Steady gait observed.    The patient's speech was fluent and normal in tone. Soft spoken but not difficult to comprehend. Not pressured.   The patient's mood is "good".  Affect is overall flat, constricted but reactive, becoming tearful when discussing Sabianist. Mood incongruent.   Thought process is overall linear and goal directed.  Denies any delusions or hallucinations. Denies any suicidal or homicidal ideation, intent, or plan.    Insight is poor.  Judgment is poor to fair.  Impulse control has been fair on the unit. The patient appears stated age, fair hygiene and dressed appropriately.    The patient was calm, cooperative with the interview and but overall flat relatedness. Minimal eye contact, looking straight ahead.   No psychomotor agitation or retardation noted.  Steady gait observed.    The patient's speech was fluent and normal in tone. Soft spoken but not difficult to comprehend. Not pressured.   The patient's mood is "good".  Affect is overall flat, constricted but reactive, becoming tearful when discussing Taoist. Mood incongruent.   Thought process is overall linear and goal directed.  Denies any delusions or hallucinations. Denies any suicidal or homicidal ideation, intent, or plan.    Insight is poor.  Judgment is poor to fair.  Impulse control has been fair on the unit.

## 2023-12-22 NOTE — BH INPATIENT PSYCHIATRY PROGRESS NOTE - NSBHFUPINTERVALHXFT_PSY_A_CORE
Chart reviewed. Case d/w interdisciplinary team. Patient seen and examined. No acute overnight events, no PRNs required/requested. Refused standing medications this AM--abilify and iron supplement. No physical complaints. Per staff, no disorganized behaviors and patient mostly staying in her room staring out the window.    On interview today, patient reports being in "good" mood. Spent most of yesterday in her room reading the New Testament and praying. Explored Sikhism and its impact on her life. Reports that she is not part of a specific Jain or group. Visited a Jain this summer, but it was not to her liking. Finds comfort in reading the bible at the moment to practice her individual spirituality. Adds that her family has been intrusive and overbearing about her spirituality in the days leading up to admission. Finds being in the unit freeing in the sense she can be left alone to practice her spirituality. Which is why she put restrictions on visitors. Says that she has been talking close to daily with mother, though she has not spoken with her boyfriend. She does not want the providers to contact the boyfriend. When asked about how patient imagines reconciling these differences with family and partner, she expresses uncertainty and became tearful.     Slept well last night. No issues with appetite--ate three meals yesterday. Energy level is "good". Thoughts are clear without racing or flights of ideas. Does not find her thought to be loud. Denies paranoia, ideas of reference, or grandiose delusions. Denies SIIP and HIIP.  Chart reviewed. Case d/w interdisciplinary team. Patient seen and examined. No acute overnight events, no PRNs required/requested. Refused standing medications this AM--abilify and iron supplement. No physical complaints. Per staff, no disorganized behaviors and patient mostly staying in her room staring out the window.    On interview today, patient reports being in "good" mood. Spent most of yesterday in her room reading the New Testament and praying. Explored Denominational and its impact on her life. Reports that she is not part of a specific Cheondoism or group. Visited a Cheondoism this summer, but it was not to her liking. Finds comfort in reading the bible at the moment to practice her individual spirituality. Adds that her family has been intrusive and overbearing about her spirituality in the days leading up to admission. Finds being in the unit freeing in the sense she can be left alone to practice her spirituality. Which is why she put restrictions on visitors. Says that she has been talking close to daily with mother, though she has not spoken with her boyfriend. She does not want the providers to contact the boyfriend. When asked about how patient imagines reconciling these differences with family and partner, she expresses uncertainty and became tearful.     Slept well last night. No issues with appetite--ate three meals yesterday. Energy level is "good". Thoughts are clear without racing or flights of ideas. Does not find her thought to be loud. Denies paranoia, ideas of reference, or grandiose delusions. Denies SIIP and HIIP.

## 2023-12-22 NOTE — BH INPATIENT PSYCHIATRY PROGRESS NOTE - NSBHATTESTCOMMENTATTENDFT_PSY_A_CORE
Today on interview patient remains with flat and constricted affect, mostly isolative on the unit and engaging in Jewish behaviors. No clear s/s edda and most prominent potential symptom is Jewish focus. Collateral c/f first episode psychosis and possible recent edda but hard to tell based on unit behaviors and reported symptoms. Continue to offer abilify. Rest of plan as above. Today on interview patient remains with flat and constricted affect, mostly isolative on the unit and engaging in Mormonism behaviors. No clear s/s edda and most prominent potential symptom is Mormonism focus. Collateral c/f first episode psychosis and possible recent edda but hard to tell based on unit behaviors and reported symptoms. Continue to offer abilify. Rest of plan as above.

## 2023-12-26 NOTE — BH INPATIENT PSYCHIATRY PROGRESS NOTE - NSBHMETABOLIC_PSY_ALL_CORE_FT
BMI:   HbA1c:   Glucose:   BP: --Vital Signs Last 24 Hrs  T(C): 36.9 (12-26-23 @ 06:48), Max: 36.9 (12-26-23 @ 06:48)  T(F): 98.5 (12-26-23 @ 06:48), Max: 98.5 (12-26-23 @ 06:48)  HR: --  BP: --  BP(mean): --  RR: 16 (12-26-23 @ 06:48) (16 - 16)  SpO2: --    Orthostatic VS  12-26-23 @ 06:48  Lying BP: --/-- HR: --  Sitting BP: 111/49 HR: 72  Standing BP: 108/65 HR: 81  Site: --  Mode: --  Orthostatic VS  12-25-23 @ 08:07  Lying BP: --/-- HR: --  Sitting BP: 114/75 HR: 73  Standing BP: 108/77 HR: 87  Site: --  Mode: --    Lipid Panel:  BMI:   HbA1c:   Glucose:   BP: --Vital Signs Last 24 Hrs  T(C): 36.6 (12-27-23 @ 06:24), Max: 36.8 (12-26-23 @ 20:50)  T(F): 97.9 (12-27-23 @ 06:24), Max: 98.2 (12-26-23 @ 20:50)  HR: --  BP: --  BP(mean): --  RR: --  SpO2: --    Orthostatic VS  12-27-23 @ 06:24  Lying BP: --/-- HR: --  Sitting BP: 115/63 HR: 89  Standing BP: 119/71 HR: 91  Site: --  Mode: --  Orthostatic VS  12-26-23 @ 06:48  Lying BP: --/-- HR: --  Sitting BP: 111/49 HR: 72  Standing BP: 108/65 HR: 81  Site: --  Mode: --    Lipid Panel:

## 2023-12-26 NOTE — BH TREATMENT PLAN - NSDCCRITERIA_PSY_ALL_CORE
When stable and no longer experiencing manic symptoms
When stable and no longer experiencing manic symptoms

## 2023-12-26 NOTE — BH INPATIENT PSYCHIATRY PROGRESS NOTE - NSBHFUPINTERVALHXFT_PSY_A_CORE
Chart reviewed. Case d/w interdisciplinary team. Patient seen and examined. No acute events over weekend. No PRNs required/requested. Refused standing medications--abilify and iron supplement. No physical complaints. Per staff, patient has been more visible in milieu and interactive with other patients.     On interview today, patient reports being in "very good" mood. Spent holiday weekend engaged in similar activities as last week--keeping to herself, reading/studying the bible. Reports that she has been out in the milieu more after she received a new roommate on Friday. She wanted her roommate get acclimated to the unit and wanted to give her personal space. Feels that she has had no issues socializing with others in the unit, especially one patient with whom she was able to have cecy conversation about her spirituality. Had a visit with her family over the weekend, who shared with pt that they with her to "get better soon". Pt feels that what they mean by that is for her religiosity to improve, though she does not want to alter that. Other aspects that were concerning to family such as sleep and appetite have since improved.     Mood is "very good". Slept well last night. No issues with appetite--ate three meals yesterday. Energy level is adequate with good daily motivation. Denies flights of ideas, intrusive thoughts. Denies referential thinking, grandiose thinking, or paranoia. Denies AH/VH. Denies SIIP and HIIP.

## 2023-12-26 NOTE — BH INPATIENT PSYCHIATRY PROGRESS NOTE - MSE UNSTRUCTURED FT
The patient appears stated age, fair hygiene and dressed appropriately.    The patient was calm, cooperative with the interview and but overall flat relatedness. Minimal eye contact.   No psychomotor agitation or retardation noted.  Steady gait observed.    The patient's speech was fluent and normal in tone. Soft spoken but not difficult to comprehend. Not pressured.   The patient's mood is "very good".  Affect is overall flat, constricted, and stable. Mood incongruent.   Thought process is overall linear and goal directed.  Thought content focused on [employer] practices. Denies any delusions or hallucinations. Denies any suicidal or homicidal ideation, intent, or plan.    Insight is poor.  Judgment is poor to fair.  Impulse control has been fair on the unit. The patient appears stated age, fair hygiene and dressed appropriately.    The patient was calm, cooperative with the interview and but overall flat relatedness. Minimal eye contact.   No psychomotor agitation or retardation noted.  Steady gait observed.    The patient's speech was fluent and normal in tone. Soft spoken but not difficult to comprehend. Not pressured.   The patient's mood is "very good".  Affect is overall flat, constricted, and stable. Mood incongruent.   Thought process is overall linear and goal directed.  Thought content focused on Taoism practices. Denies any delusions or hallucinations. Denies any suicidal or homicidal ideation, intent, or plan.    Insight is poor.  Judgment is poor to fair.  Impulse control has been fair on the unit.

## 2023-12-26 NOTE — BH TREATMENT PLAN - NSTXPLANTHERAPYSESSIONSFT_PSY_ALL_CORE
12-26-23  Type of therapy: Cognitive behavior therapy, Dialectical behavior therapy, Leisure development, Peer advocate  Type of session: Individual  Level of patient participation: Engaged  Duration of participation: 15 minutes  Therapy conducted by: Psych rehab  Therapy Summary: Writer met with patient for an individual session in order to review progress towards psychiatric rehabilitation goals. Patient was pleasant, polite and engaged in the session. Patient was forthcoming and open while conversing with the writer. Patient has demonstrated limited improvements in psychiatric symptoms over the past seven days. Patient reported fair mood and stated that she has been doing well. Patient demonstrates limited insight into psychiatric symptoms and was unable to explain recovery goals. Patient has been refusing medication. Patient endorsed fair sleep and appetite. Due to the COVID-19 pandemic, unit structure and activities are re-evaluated on a consistent basis in effort to maintain the safety of patients and staff. Patient attends some psychiatric rehabilitation groups though she stated that she does not like attending groups with music or with topics that are not relevant to her. Patient is increasingly visible on the unit and maintains fair behavioral control. Patient has been more social with select peers and is able to make needs known to staff.

## 2023-12-26 NOTE — BH INPATIENT PSYCHIATRY PROGRESS NOTE - NSBHASSESSSUMMFT_PSY_ALL_CORE
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    Diagnostic impression on admission is bipolar disorder vs psychosis.    Today overall, pt continues to show no signs of over edda on exam, though reported behavior and symptoms from collateral history indicate otherwise. Collateral history lends evidence to possible prodromal symptoms (isolation, behavior change) of psychosis for years leading up to admission.  MSE today notable for poor eye contact, flat affect, and overall odd relatedness. Continues to endorse elevated mood despite rather flat affect, and rather odd that she has not asked about discharge yet. Currently denying overt signs of psychosis, though preoccupation with of religiosity is a potential symptom. No behavioral outbursts or odd/intrusive behavior in unit. Appropriate to continue abilify trial that was started in the ED, though patient has been refusing all medications. Will need outpatient care setup by SW prior to discharge.     Patient needs continued hospitalization for management of manic and potentially psychotic symptoms that impair patient's ability to care for self.     Plan:  1. Legal: Admitted on/continue 9.27 status  2. Safety: No reported SI/SIB/HI/VI currently on unit; continue routine observation.  	-Haldol/Ativan/Benadryl PRN medications for safety/agitation  3. Psychiatric:  	- ARIPIPRAZOLE trial: continue 5mg po daily for edda >> patient refused morning dose today  4. Therapy: group & milieu therapy  5. Medical:   	# microcytic anemia  - continue home ferrous sulfate; repeat lab on 12/19 Hgb 9 (admission Hgb 8.6)  	# Utox + Bz and THC  6. Collateral: Collateral from boyfriend (ED); collateral from mother (12/19)  7. 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    Diagnostic impression on admission is bipolar disorder vs psychosis.    Today overall, pt continues to show no signs of over edda on exam, though reported behavior and symptoms from collateral history indicate otherwise. Collateral history lends evidence to possible prodromal symptoms (isolation, behavior change) of psychosis for years leading up to admission.  MSE today notable for poor eye contact, flat affect, and overall odd relatedness. Continues to endorse elevated mood despite rather flat affect, and rather odd that she has not asked about discharge yet. Currently denying overt signs of psychosis, though preoccupation with of religiosity is a potential symptom. No behavioral outbursts or odd/intrusive behavior in unit. Appropriate to continue abilify trial that was started in the ED, though patient has been refusing all medications. Will need outpatient care setup by SW prior to discharge.     Patient needs continued hospitalization for management of manic and potentially psychotic symptoms that impair patient's ability to care for self.     Plan:  1. Legal: Admitted on/continue 9.27 status  2. Safety: No reported SI/SIB/HI/VI currently on unit; continue routine observation.  	-Haldol/Ativan/Benadryl PRN medications for safety/agitation  3. Psychiatric:  	- ARIPIPRAZOLE trial: continue 5mg po daily for edda >> patient refused morning dose today  4. Therapy: group & milieu therapy  5. Medical:   	# microcytic anemia  - continue home ferrous sulfate; repeat lab on 12/19 Hgb 9 (admission Hgb 8.6)  	# Utox + Bz and THC  6. Collateral: Collateral from boyfriend (ED); collateral from mother (12/19)  7. 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    Diagnostic impression on admission is bipolar disorder vs psychosis.    Today overall, pt continues to show no signs of over edda on exam, though reported behavior and symptoms from collateral history indicate otherwise. Collateral history lends evidence to possible prodromal symptoms (isolation, behavior change) of psychosis for years leading up to admission.  MSE today notable for poor eye contact, flat affect, and overall odd relatedness. Continues to endorse good mood despite rather flat affect, and rather odd that she has not asked about discharge yet. Currently denying overt signs of psychosis, though preoccupation with religiosity is a potential symptom. No behavioral outbursts or odd/intrusive behavior in unit. Appropriate to continue abilify trial that was started in the ED, though patient has been refusing all medications. Will need outpatient care setup by SW prior to discharge.     Patient needs continued hospitalization for management of manic and potentially psychotic symptoms that impair patient's ability to care for self.     Plan:  1. Legal: Admitted on/continue 9.27 status  2. Safety: No reported SI/SIB/HI/VI currently on unit; continue routine observation.  	-Haldol/Ativan/Benadryl PRN medications for safety/agitation  3. Psychiatric:  	- ARIPIPRAZOLE trial: continue 5mg po daily for edda >> patient refused morning dose today  4. Therapy: group & milieu therapy  5. Medical:   	# microcytic anemia  - continue home ferrous sulfate; repeat lab on 12/19 Hgb 9 (admission Hgb 8.6)  	# Utox + Bz and THC  6. Collateral: Collateral from boyfriend (ED); collateral from mother (12/19)  7. Disposition: When stable/pending clinical improvement

## 2023-12-26 NOTE — BH CHART NOTE - NSEVENTNOTEFT_PSY_ALL_CORE
Called mother (Gilberto Lind): [phone]    Mother visited patient over the holiday weekend. Feels she is not doing well. Add that pt is acting odd-- flat affect, hyperreligiosity, not making eye contact, some element of paranoia -- are all new symptoms that do not fit with baseline for [facility]. She also did not want to be touched. She was carrying around a notebook, but did not want to show what she was writing. She reports appreciating solitude in the unit. One of the days she asked "is the boyfriend laughing at her". She has not spoken to her boyfriend since being admitted, despite him communicating with patient's family for updates. Also she is vocalizing odd preferences for gifts that is off baseline--she wants bright colors (i.e. lavender), no graffiti or images. She is usually very intelligent, but seems to have difficult time making sense of why she is in the hospital and what others are concerned about. She continuously was reporting that she did not deserve to be in the inpatient unit. Mother's paternal uncle also apparently has bipolar disorder. Discussed next steps in patient's care and plans regarding discharge.  Called mother (Gilberto Lind): [phone]    Mother visited patient over the holiday weekend. Feels she is not doing well. Add that pt is acting odd-- flat affect, hyperreligiosity, not making eye contact, some element of paranoia -- are all new symptoms that do not fit with baseline for [facility]. She also did not want to be touched. She was carrying around a notebook, but did not want to show what she was writing. She reports appreciating solitude in the unit. One of the days she asked "is the boyfriend laughing at her". She has not spoken to her boyfriend since being admitted, despite him communicating with patient's family for updates. Also she is vocalizing odd preferences for gifts that is off baseline--she wants bright colors (i.e. lavender), no graffiti or images. She is usually very intelligent, but seems to have difficult time making sense of why she is in the hospital and what others are concerned about. She continuously was reporting that she did not deserve to be in the inpatient unit. Mother's paternal uncle also apparently has bipolar disorder. Discussed next steps in patient's care and plans regarding discharge.

## 2023-12-26 NOTE — BH TREATMENT PLAN - NSTXDCOPLKINTERSW_PSY_ALL_CORE
Support and psychoed to be provided and all elements of the discharge plan to be arranged when appropriate.
Support and psychoed to be provided and all elements of the discharge plan to be arranged when appropriate.

## 2023-12-26 NOTE — BH INPATIENT PSYCHIATRY PROGRESS NOTE - NSBHATTESTCOMMENTATTENDFT_PSY_A_CORE
Today on exam patient continues to present as flat but with some more reactivity in affect and appropriate relatedness compared to prior. Still mostly to self, focused on Adventist but denies other delusions and hallucinations. In behavioral control, sleeping eating and attending to ADLs. Continues to decline meds. Will touch base w/ Mom re: current relationship to patient's baseline and provide psychoeducation-as pt is declining meds and there is no clear argument for TOO may have to discharge soon despite concerns re: symptoms. Today on exam patient continues to present as flat but with some more reactivity in affect and appropriate relatedness compared to prior. Still mostly to self, focused on Ep[employer]op[employer] but denies other delusions and hallucinations. In behavioral control, sleeping eating and attending to ADLs. Continues to decline meds. Will touch base w/ Mom re: current relationship to patient's baseline and provide psychoeducation-as pt is declining meds and there is no clear argument for TOO may have to discharge soon despite concerns re: symptoms.

## 2023-12-26 NOTE — BH TREATMENT PLAN - NSTXPATIENTPARTICIPATE_PSY_ALL_CORE
Patient participated in identification of needs/problems/goals for treatment/Patient participated in development of after care plan
Patient participated in identification of needs/problems/goals for treatment/Patient participated in defining interventions

## 2023-12-26 NOTE — BH INPATIENT PSYCHIATRY PROGRESS NOTE - NSBHATTESTBILLING_PSY_A_CORE
70767-Xytrvoxrin OBS or IP - moderate complexity OR 35-49 mins 60914-Uccypjkupd OBS or IP - moderate complexity OR 35-49 mins

## 2023-12-26 NOTE — BH INPATIENT PSYCHIATRY PROGRESS NOTE - NSBHCHARTREVIEWVS_PSY_A_CORE FT
Vital Signs Last 24 Hrs  T(C): 36.9 (12-26-23 @ 06:48), Max: 36.9 (12-26-23 @ 06:48)  T(F): 98.5 (12-26-23 @ 06:48), Max: 98.5 (12-26-23 @ 06:48)  HR: --  BP: --  BP(mean): --  RR: 16 (12-26-23 @ 06:48) (16 - 16)  SpO2: --    Orthostatic VS  12-26-23 @ 06:48  Lying BP: --/-- HR: --  Sitting BP: 111/49 HR: 72  Standing BP: 108/65 HR: 81  Site: --  Mode: --  Orthostatic VS  12-25-23 @ 08:07  Lying BP: --/-- HR: --  Sitting BP: 114/75 HR: 73  Standing BP: 108/77 HR: 87  Site: --  Mode: --   Vital Signs Last 24 Hrs  T(C): 36.6 (12-27-23 @ 06:24), Max: 36.8 (12-26-23 @ 20:50)  T(F): 97.9 (12-27-23 @ 06:24), Max: 98.2 (12-26-23 @ 20:50)  HR: --  BP: --  BP(mean): --  RR: --  SpO2: --    Orthostatic VS  12-27-23 @ 06:24  Lying BP: --/-- HR: --  Sitting BP: 115/63 HR: 89  Standing BP: 119/71 HR: 91  Site: --  Mode: --  Orthostatic VS  12-26-23 @ 06:48  Lying BP: --/-- HR: --  Sitting BP: 111/49 HR: 72  Standing BP: 108/65 HR: 81  Site: --  Mode: --

## 2023-12-26 NOTE — BH TREATMENT PLAN - NSTXDISORGINTERRN_PSY_ALL_CORE
fyi:Duplicate: message sent to MA and md if received.  
Assess for signs and symptoms of disorganization, redirect and reorient as necessary, provide support, maintain safety, explore healthy coping skills, encourage pt to participate in groups and unit activities, administer and educate on ordered medications
Assess for signs and symptoms of disorganization, redirect and reorient as necessary, provide support, maintain safety, explore healthy coping skills, encourage pt to participate in groups and unit activities, administer and educate on ordered medications

## 2023-12-27 DIAGNOSIS — F29 UNSPECIFIED PSYCHOSIS NOT DUE TO A SUBSTANCE OR KNOWN PHYSIOLOGICAL CONDITION: ICD-10-CM

## 2023-12-27 PROCEDURE — 99232 SBSQ HOSP IP/OBS MODERATE 35: CPT | Mod: GC

## 2023-12-27 PROCEDURE — 90853 GROUP PSYCHOTHERAPY: CPT

## 2023-12-27 NOTE — BH SAFETY PLAN - SUICIDE PREVENTION LIFELINE PHONES
Suicide Prevention Lifeline Phone: [phone] ([phone]) Suicide Prevention Lifeline Phone: [phone] TALK ([phone])

## 2023-12-27 NOTE — BH INPATIENT PSYCHIATRY PROGRESS NOTE - NSBHASSESSSUMMFT_PSY_ALL_CORE
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    Diagnostic impression on admission is bipolar disorder vs psychosis.    Today overall, pt continues to show no signs of overt edda on exam. Collateral history lends evidence to possible prodromal symptoms (isolation, behavior change) of psychosis for years leading up to admission. Currently denying overt signs of psychosis, though preoccupation with religiosity is a potential symptom. Reporting discomfort with her new roommate and asking for discharge. MSE today notable for continued poor eye contact and overall flat relatedness, but noticeably dysphoric affect. No behavioral outbursts or odd/intrusive behavior in unit. Appropriate to continue abilify trial that was started in the ED, though patient has been refusing all medications. Will need outpatient care to be setup by  prior to discharge.     Patient needs continued hospitalization for management of manic and potentially psychotic symptoms that impair patient's ability to care for self. Stable symptoms and pending aftercare set-up prior to discharge.     Plan:  1. Legal: Admitted on/continue 9.27 status  2. Safety: No reported SI/SIB/HI/VI currently on unit; continue routine observation.  	-Haldol/Ativan/Benadryl PRN medications for safety/agitation  3. Psychiatric:  	- ARIPIPRAZOLE trial: continue 5mg po daily for edda >> patient refused morning dose today  4. Therapy: group & milieu therapy  5. Medical:   	# microcytic anemia  - continue home ferrous sulfate; repeat lab on 12/19 Hgb 9 (admission Hgb 8.6)  	# Utox + Bz and THC  6. Collateral: Collateral from boyfriend (ED); collateral from mother (12/19)  7. Disposition: When stable/pending clinical improvement. Possible discharge tomorrow pending aftercare setup.    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    Diagnostic impression on admission is bipolar disorder vs psychosis.    Today overall, pt continues to show no signs of overt edda on exam. Collateral history lends evidence to possible prodromal symptoms (isolation, behavior change) of psychosis for years leading up to admission. Currently denying overt signs of psychosis, though preoccupation with religiosity is a potential symptom. Reporting discomfort with her new roommate and asking for discharge. MSE today notable for continued poor eye contact and overall flat relatedness, but noticeably dysphoric affect. No behavioral outbursts or odd/intrusive behavior in unit. Appropriate to continue abilify trial that was started in the ED, though patient has been refusing all medications. Will need outpatient care to be setup by  prior to discharge.     Patient needs continued hospitalization for management of manic and potentially psychotic symptoms that impair patient's ability to care for self. Stable symptoms and pending aftercare set-up prior to discharge.     Plan:  1. Legal: Admitted on/continue 9.27 status  2. Safety: No reported SI/SIB/HI/VI currently on unit; continue routine observation.  	-Haldol/Ativan/Benadryl PRN medications for safety/agitation  3. Psychiatric:  	- ARIPIPRAZOLE trial: continue 5mg po daily for edda >> patient refused morning dose today  4. Therapy: group & milieu therapy  5. Medical:   	# microcytic anemia  - continue home ferrous sulfate; repeat lab on 12/19 Hgb 9 (admission Hgb 8.6)  	# Utox + Bz and THC  6. Collateral: Collateral from boyfriend (ED); collateral from mother (12/19)  7. Disposition: When stable/pending clinical improvement. Possible discharge tomorrow pending aftercare setup.    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    Diagnostic impression on admission is bipolar disorder vs psychosis.    Today overall, pt continues to show no signs of overt edda or positive sxs of psychosis on exam. Collateral history lends evidence to possible prodromal symptoms (isolation, behavior change) of psychosis for years leading up to admission. Currently denying overt signs of psychosis, though preoccupation with religiosity is a potential symptom. Reporting discomfort with her new roommate and asking for discharge. MSE today notable for continued poor eye contact and overall flat relatedness, but noticeably dysphoric affect. No behavioral outbursts or odd/intrusive behavior in unit. Appropriate to continue abilify trial that was started in the ED, though patient has been refusing all medications. Will need outpatient care to be setup by SW prior to discharge.     Patient needs continued hospitalization for safety and safe d/c plan.    Plan:  1. Legal: Admitted on/continue 9.27 status  2. Safety: No reported SI/SIB/HI/VI currently on unit; continue routine observation.  	-Haldol/Ativan/Benadryl PRN medications for safety/agitation  3. Psychiatric:  	- ARIPIPRAZOLE trial: continue 5mg po daily for edda >> patient refused morning dose today  4. Therapy: group & milieu therapy  5. Medical:   	# microcytic anemia  - continue home ferrous sulfate (pt declining); repeat lab on 12/19 Hgb 9 (admission Hgb 8.6)  	# Utox + Bz and THC  6. Collateral: Collateral from boyfriend (ED); collateral from mother (12/19)  7. Disposition: When stable/pending clinical improvement. Possible discharge tomorrow pending aftercare setup.

## 2023-12-27 NOTE — BH INPATIENT PSYCHIATRY PROGRESS NOTE - NSBHATTESTBILLING_PSY_A_CORE
[id number]-Yxbdggyeck OBS or IP - moderate complexity OR 35-49 mins [id number]-Yubfyqqmhe OBS or IP - moderate complexity OR 35-49 mins

## 2023-12-27 NOTE — BH INPATIENT PSYCHIATRY PROGRESS NOTE - NSBHMETABOLIC_PSY_ALL_CORE_FT
BMI:   HbA1c:   Glucose:   BP: --Vital Signs Last 24 Hrs  T(C): 36.6 (12-27-23 @ 06:24), Max: 36.8 (12-26-23 @ 20:50)  T(F): 97.9 (12-27-23 @ 06:24), Max: 98.2 (12-26-23 @ 20:50)  HR: --  BP: --  BP(mean): --  RR: --  SpO2: --    Orthostatic VS  12-27-23 @ 06:24  Lying BP: --/-- HR: --  Sitting BP: 115/63 HR: 89  Standing BP: 119/71 HR: 91  Site: --  Mode: --  Orthostatic VS  12-26-23 @ 06:48  Lying BP: --/-- HR: --  Sitting BP: 111/49 HR: 72  Standing BP: 108/65 HR: 81  Site: --  Mode: --    Lipid Panel:

## 2023-12-27 NOTE — BH INPATIENT PSYCHIATRY PROGRESS NOTE - NSBHFUPINTERVALCCFT_PSY_A_CORE
"My family was overbearing."
"My mood is very good."
"I don't feel comfortable in my room anymore."
"I am perfectly calm."
"I had a good day yesterday."
"My family was overbearing."

## 2023-12-27 NOTE — BH PSYCHOLOGY - GROUP THERAPY NOTE - NSPSYCHOLGRPDBTPT_PSY_A_CORE
stable mood and affect in group/no self-destructive impulses/behaviors/other...

## 2023-12-27 NOTE — BH INPATIENT PSYCHIATRY PROGRESS NOTE - NSBHCHARTREVIEWVS_PSY_A_CORE FT
Vital Signs Last 24 Hrs  T(C): 36.6 (12-27-23 @ 06:24), Max: 36.8 (12-26-23 @ 20:50)  T(F): 97.9 (12-27-23 @ 06:24), Max: 98.2 (12-26-23 @ 20:50)  HR: --  BP: --  BP(mean): --  RR: --  SpO2: --    Orthostatic VS  12-27-23 @ 06:24  Lying BP: --/-- HR: --  Sitting BP: 115/63 HR: 89  Standing BP: 119/71 HR: 91  Site: --  Mode: --  Orthostatic VS  12-26-23 @ 06:48  Lying BP: --/-- HR: --  Sitting BP: 111/49 HR: 72  Standing BP: 108/65 HR: 81  Site: --  Mode: --

## 2023-12-27 NOTE — BH INPATIENT PSYCHIATRY PROGRESS NOTE - NSICDXBHPRIMARYDX_PSY_ALL_CORE
Mirtha   F30.9  

## 2023-12-27 NOTE — BH PSYCHOLOGY - GROUP THERAPY NOTE - NSPSYCHOLGRPDBTGOAL_PSY_A_CORE
reduce mood and affective lability/improve ability to indentify feelings/improve ability to communicate feelings/reduce vulnerability to emotional dysregualation

## 2023-12-27 NOTE — BH INPATIENT PSYCHIATRY PROGRESS NOTE - NSTXDISORGGOAL_PSY_ALL_CORE
Will identify 2 coping skills that assist in organizing

## 2023-12-27 NOTE — BH INPATIENT PSYCHIATRY PROGRESS NOTE - PRN MEDS
MEDICATIONS  (PRN):  diphenhydrAMINE 50 milliGRAM(s) Oral every 6 hours PRN agitation/eps prophylaxis  diphenhydrAMINE Injectable 50 milliGRAM(s) IntraMuscular once PRN severe agitation/eps prophylaxis  haloperidol     Tablet 5 milliGRAM(s) Oral every 6 hours PRN agitation  haloperidol    Injectable 5 milliGRAM(s) IntraMuscular once PRN Agitation  LORazepam     Tablet 2 milliGRAM(s) Oral every 6 hours PRN Agitation  

## 2023-12-27 NOTE — BH PSYCHOLOGY - GROUP THERAPY NOTE - NSPSYCHOLGRPDBTPT_PSY_A_CORE FT
DBT Group is a group in which patients learn skills to better manage their emotions and behaviors. Group begins with a mindfulness practice so that patients have an opportunity to practice observing their internal and external experiences. Following the mindfulness exercise the group learns new skills in a didactic format. Group today focused on the “distress tolerance” module, which are skills to help patients manage their crisis urges. Specifically, patients reviewed the “IMPROVE” skills. Each skill was reviewed and patients practiced in the session. Patients were encouraged to practice these skills while on the unit.
DBT skills generalization group is a group in which patients review the skill taught the day before, and patients have the opportunity to troubleshoot the skill, engage in more practice, and share their experience using the skill. Today’s skills review group focused on the skills of “radical acceptance” and "willingness" which include accepting painful situations in their lives they cannot change through turning the mind and practicing engaging in reality effectively. Patients were asked to determine difficult situations in their lives they needed to work on accepting, and provided examples of ways to practice this while in the hospital. 
DBT Group is a group in which patients learn skills to better manage their emotions and behaviors. Group begins with a mindfulness practice so that patients have an opportunity to practice observing their internal and external experiences.  Following the mindfulness exercise the group learns new skills in a didactic format. Group today focused on the “emotion regulation” module.  Specifically, patients learned the skills of Accumulating Positives in the long term, or accumulating positive emotions in the long term to build a "life worth living".  provided examples and suggestions of ways to improve these areas, and patients were encouraged to engage in discussion of ways they can prioritize and implement this skill.

## 2023-12-27 NOTE — BH INPATIENT PSYCHIATRY PROGRESS NOTE - NSDCCRITERIA_PSY_ALL_CORE
When stable and no longer experiencing manic symptoms

## 2023-12-27 NOTE — BH INPATIENT PSYCHIATRY PROGRESS NOTE - CURRENT MEDICATION
Respiratory at bedside,.  Treatment administered
MEDICATIONS  (STANDING):  ARIPiprazole 5 milliGRAM(s) Oral daily  ferrous    sulfate 325 milliGRAM(s) Oral daily  LORazepam   Injectable 2 milliGRAM(s) IntraMuscular Once    MEDICATIONS  (PRN):  diphenhydrAMINE 50 milliGRAM(s) Oral every 6 hours PRN agitation/eps prophylaxis  diphenhydrAMINE Injectable 50 milliGRAM(s) IntraMuscular once PRN severe agitation/eps prophylaxis  haloperidol     Tablet 5 milliGRAM(s) Oral every 6 hours PRN agitation  haloperidol    Injectable 5 milliGRAM(s) IntraMuscular once PRN Agitation  LORazepam     Tablet 2 milliGRAM(s) Oral every 6 hours PRN Agitation  

## 2023-12-27 NOTE — BH INPATIENT PSYCHIATRY PROGRESS NOTE - NSBHATTESTCOMMENTATTENDFT_PSY_A_CORE
Today patient is mostly focused on difficulties she is having with her roommate who is intrusive and upsets the patient. Denying cecy mood symptoms, delusions, hallucinations, SI/HI. declining medications. MSE still with flat affect but tearful at times. Although pt clearly suffering with symptoms of mental illness cannot go to court for TOO at this time and will likely need to d/c pending safe discharge plan.

## 2023-12-27 NOTE — BH INPATIENT PSYCHIATRY PROGRESS NOTE - NSBHFUPINTERVALHXFT_PSY_A_CORE
Chart reviewed. Case d/w interdisciplinary team. Patient seen and examined. No acute events over weekend. No PRNs required/requested. Refused standing medications--abilify and iron supplement. Per staff, patient has been more visible in milieu and interactive with other patients.     On interview today, patient reports feeling "not well" as she feels uncomfortable being in her room due to her roommate. Pt's roommate was admitted last Friday and had displayed some disorganized behavior that concerned the patient initially. Her roommate made pt's bed and went through her personal belongings without asking for permission. She does not acknowledge pt's personal space and has been continually intrusive. As a consequence, patient has been avoiding staying in her room and has been feeling anxious as she no longer has a place she feels safe and comfortable. She does not want a room/roommate change as she sees it as an escalation of the situation. She would rather not draw attention to the fact that she feels uncomfortable around her roommate.      Mood is "not well". Sleep more interrupted yesterday as she felt uneasy falling asleep last night. No issues with appetite--ate three meals yesterday. Energy level is adequate with good daily motivation. Denies flights of ideas, intrusive thoughts. Denies referential thinking, grandiose thinking, or paranoia. Denies AH/VH. Denies SIIP and HIIP. No physical complaints. Chart reviewed. Case d/w interdisciplinary team. Patient seen and examined. No acute events over weekend. No PRNs required/requested. Refused standing medications--abilify and iron supplement. Per staff, patient has been more visible in milieu and interactive with other patients.     On interview today, patient reports feeling "not well" as she feels uncomfortable being in her room due to her roommate. Pt's roommate was admitted last Friday and had displayed some disorganized behavior that concerned the patient initially. Her roommate made pt's bed and went through her personal belongings without asking for permission. For instance, she found her toothbrush outside of a paper bag that she keeps toiletries in. The roommate does not acknowledge pt's personal space and has been continually intrusive. As a consequence, patient has been avoiding staying in her room and has been feeling anxious as she no longer has a place she feels safe and comfortable. She does not want a room/roommate change as she sees it as an escalation of the situation. She would rather not draw attention to the fact that she feels uncomfortable around her roommate.      Mood is "not well". Sleep more interrupted yesterday as she felt uneasy falling asleep last night. No issues with appetite--ate three meals yesterday. Energy level is adequate with good daily motivation. Denies flights of ideas, intrusive thoughts. Denies referential thinking, grandiose thinking, or paranoia. Denies AH/VH. Denies SIIP and HIIP. No physical complaints.

## 2023-12-27 NOTE — BH INPATIENT PSYCHIATRY PROGRESS NOTE - NSBHANTIPSYCHOTIC_PSY_ALL_CORE_FT
Patient has been refusing meds. 

## 2023-12-27 NOTE — BH INPATIENT PSYCHIATRY PROGRESS NOTE - MSE UNSTRUCTURED FT
The patient appears stated age, fair hygiene and dressed appropriately.    The patient was calm, cooperative with the interview and but overall flat relatedness. Minimal eye contact.   No psychomotor agitation or retardation noted.  Steady gait observed.    The patient's speech was fluent and normal in tone. Soft spoken but not difficult to comprehend. Not pressured.   The patient's mood is "not well".  Affect is overall dysphoric and constricted. At times tearful. Mood congruent.   Thought process is overall linear and goal directed.  Thought content focused on her discomfort with roommate. Denies any delusions or hallucinations. Denies any suicidal or homicidal ideation, intent, or plan.    Insight is poor.  Judgment is poor to fair.  Impulse control has been fair on the unit.

## 2023-12-28 VITALS — TEMPERATURE: 98 F | RESPIRATION RATE: 18 BRPM

## 2023-12-28 PROCEDURE — 99239 HOSP IP/OBS DSCHRG MGMT >30: CPT | Mod: GC

## 2023-12-28 RX ORDER — ARIPIPRAZOLE 15 MG/1
1 TABLET ORAL
Qty: 0 | Refills: 0 | DISCHARGE
Start: 2023-12-28

## 2023-12-28 RX ORDER — FERROUS SULFATE 325(65) MG
1 TABLET ORAL
Qty: 0 | Refills: 0 | DISCHARGE
Start: 2023-12-28

## 2023-12-28 NOTE — BH DISCHARGE NOTE NURSING/SOCIAL WORK/PSYCH REHAB - DISCHARGE INSTRUCTIONS AFTERCARE APPOINTMENTS
In order to check the location, date, or time of your aftercare appointment, please refer to your Discharge Instructions Document given to you upon leaving the hospital.  If you have lost the instructions please call [phone] In order to check the location, date, or time of your aftercare appointment, please refer to your Discharge Instructions Document given to you upon leaving the hospital.  If you have lost the instructions please call [phone]

## 2023-12-28 NOTE — BH INPATIENT PSYCHIATRY DISCHARGE NOTE - DESCRIPTION
Lives with family (mother, sister, GM) in a two story house. Works as [employer] and was employed at [employer] store until recently. Finished 1/2 of bachelors work at [employer], but had to take a leave of absence for financial reasons. Has boyfriend who she reports healthy relationship.  Lives with family (mother, sister, GM) in a two story house. Works as [employer] and was employed at [employer] store until recently. Finished 1/2 of bachelors work at [employer], but had to take a leave of absence for financial reasons. Has boyfriend who she reports healthy relationship.

## 2023-12-28 NOTE — BH INPATIENT PSYCHIATRY DISCHARGE NOTE - HPI (INCLUDE ILLNESS QUALITY, SEVERITY, DURATION, TIMING, CONTEXT, MODIFYING FACTORS, ASSOCIATED SIGNS AND SYMPTOMS)
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    Patient reports that about 1 week ago, she started not feeling well physically (hip issues). As a result, she reports spending most of the days laying around with low energy. She sought medical help and visited ED without significant findings and was sent home. All of a sudden last Friday, she started feeling better and reports feeling "amazing" as the body was healing. She felt energetic in response (though clarifies that it was not excessive). That day she started cleaning her room for several hours, which had been neglected during her physical ailment and her mom had made a remark about. Describes that she was "shuffling" between tasks. In between cleaning, she decided to undo her box javid, which she did. She also says that she was reading the bible and praying intermittently. She reports being a "child of Damian", though she adds that not many are aware of this as she had not been externally devout for a long time. Her behavior worried her mother, who recommended that she seek emergency care. Patient declined. Next day, she spent her day with her boyfriend mostly talking and was brought back home. On Sunday, she ended up visiting her aunt (could not provide a reason) who also began expressing worries about the patient. Pt kept repeating that she felt well and there were no physical issues. She also noticed that her boyfriend was outside of aunt's house, which was a surprise. She went outside to talk to her boyfriend and disregarded the aunt's request to stay home. Next thing she remembers, there was an ambulance. She started running away, because she felt that she did not need to go to the hospital. Ultimately EMS brought her to [facility], where she continued feel anxious and agitated because she in the hospital against her will.    Since Friday, patient notes that her sleep was "scattered" though she slept for at least "couple of hours" each day. She could not provide specific amount.  She does mention that people around her had commented that she was speaking faster compared to baseline. Denies irritability, distractibility, and taking impulsive actions. Denies h/o of depressive episode lasting > 2wks though acknowledges feeling depressed in reaction to acute stressors. Denies psychotic symptoms including delusions and hallucinations. Admits to occasional cannabis use (couple times a year). Otherwise denies substance use including nicotine, alcohol, and prescriptions medications. Denies current suicidal ideation, intent, or plan. Denies suicide attempt. Denies current and h/o HIIP.       PER ED BH Assessment Note documented in [facility] on 12/17:  "Pt seen with boyfriend. Pt is a limited historian. Pt says she is not sure why she is here, "ask my aunt." Pt makes comments that make limited sense when asked about what events led her to the hospital, speaking about differences in people and being "authentic self." Pt says that she has been sleeping well, has been "working on herself." Pt endorses good appetite, good sleep, good mood, denies SI/HI/AVH. Denies all substance use. Falls asleep during interview, asks writer to leave  Writer interviews pt's boyfriend outside of the room, provides most of the history. States that the patient was her normal self until this last weekend when she started feeling like her "hips were out of whack." Went to the chiropractor this weekend, has been hyperfixated on hips and walking since then. Says that starting on Sunday, pt had started to have moods that ranged from "hyper and too up" to irritable. Says that she did not sleep Sunday through Wednesday, went to the ED at an outside hospital on Wednesday where she received Xanax and slept that night. Did not sleep the nights since then for more than 1-2 hours each night, has had more energy than normal, was noted to speak more rapidly than normal, difficult but possible to interrupt. 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 Has been saying this repeatedly throughout the week. Pt noted to not be Scientology normally but has been hyper-Scientology this week. Also pt noted to have increase in goal-directed behaviors such as cleaning, noted to be cleaning excessively and difficult to get to stop cleaning. Pt also doing behaviors that do not make sense like running out of car to "go to [employer]" then running behind the store and come back and say that she went into the store to buy something when she did not. Noted to be mumbling to self as well so boyfriend unsure if she is responding to voices. Today aunt called police on patient due to her behaviors, noted that she ran away from the police yelling "I bind you and blind you." Per boyfriend, pt never had any other symptoms such as this before. Denies any known periods of time where she appeared sad or down. No substance use noted with patient. Pt has anemia per boyfriend but not on any other medications. 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    Patient reports that about 1 week ago, she started not feeling well physically (hip issues). As a result, she reports spending most of the days laying around with low energy. She sought medical help and visited ED without significant findings and was sent home. All of a sudden last Friday, she started feeling better and reports feeling "amazing" as the body was healing. She felt energetic in response (though clarifies that it was not excessive). That day she started cleaning her room for several hours, which had been neglected during her physical ailment and her mom had made a remark about. Describes that she was "shuffling" between tasks. In between cleaning, she decided to undo her box javid, which she did. She also says that she was reading the bible and praying intermittently. She reports being a "child of Damian", though she adds that not many are aware of this as she had not been externally devout for a long time. Her behavior worried her mother, who recommended that she seek emergency care. Patient declined. Next day, she spent her day with her boyfriend mostly talking and was brought back home. On Sunday, she ended up visiting her aunt (could not provide a reason) who also began expressing worries about the patient. Pt kept repeating that she felt well and there were no physical issues. She also noticed that her boyfriend was outside of aunt's house, which was a surprise. She went outside to talk to her boyfriend and disregarded the aunt's request to stay home. Next thing she remembers, there was an ambulance. She started running away, because she felt that she did not need to go to the hospital. Ultimately EMS brought her to [facility], where she continued feel anxious and agitated because she in the hospital against her will.    Since Friday, patient notes that her sleep was "scattered" though she slept for at least "couple of hours" each day. She could not provide specific amount.  She does mention that people around her had commented that she was speaking faster compared to baseline. Denies irritability, distractibility, and taking impulsive actions. Denies h/o of depressive episode lasting > 2wks though acknowledges feeling depressed in reaction to acute stressors. Denies psychotic symptoms including delusions and hallucinations. Admits to occasional cannabis use (couple times a year). Otherwise denies substance use including nicotine, alcohol, and prescriptions medications. Denies current suicidal ideation, intent, or plan. Denies suicide attempt. Denies current and h/o HIIP.       PER ED BH Assessment Note documented in [facility] on 12/17:  "Pt seen with boyfriend. Pt is a limited historian. Pt says she is not sure why she is here, "ask my aunt." Pt makes comments that make limited sense when asked about what events led her to the hospital, speaking about differences in people and being "authentic self." Pt says that she has been sleeping well, has been "working on herself." Pt endorses good appetite, good sleep, good mood, denies SI/HI/AVH. Denies all substance use. Falls asleep during interview, asks writer to leave  Writer interviews pt's boyfriend outside of the room, provides most of the history. States that the patient was her normal self until this last weekend when she started feeling like her "hips were out of whack." Went to the chiropractor this weekend, has been hyperfixated on hips and walking since then. Says that starting on Sunday, pt had started to have moods that ranged from "hyper and too up" to irritable. Says that she did not sleep Sunday through Wednesday, went to the ED at an outside hospital on Wednesday where she received Xanax and slept that night. Did not sleep the nights since then for more than 1-2 hours each night, has had more energy than normal, was noted to speak more rapidly than normal, difficult but possible to interrupt. 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 Has been saying this repeatedly throughout the week. Pt noted to not be Alevism normally but has been hyper-Alevism this week. Also pt noted to have increase in goal-directed behaviors such as cleaning, noted to be cleaning excessively and difficult to get to stop cleaning. Pt also doing behaviors that do not make sense like running out of car to "go to [employer]" then running behind the store and come back and say that she went into the store to buy something when she did not. Noted to be mumbling to self as well so boyfriend unsure if she is responding to voices. Today aunt called police on patient due to her behaviors, noted that she ran away from the police yelling "I bind you and blind you." Per boyfriend, pt never had any other symptoms such as this before. Denies any known periods of time where she appeared sad or down. No substance use noted with patient. Pt has anemia per boyfriend but not on any other medications. "

## 2023-12-28 NOTE — BH INPATIENT PSYCHIATRY DISCHARGE NOTE - NSBHDCHANDOFFFT_PSY_ALL_CORE
Handoff provided via email to Dr. Starks at [facility] SWAY program including information re: presentation, hospital course, and future treatment recommendations. Aware of how to reach Dr. Schaefer on 1S at [phone] if questions emerge. Handoff provided via email to Dr. Starks at [facility] SWAY program including information re: presentation, hospital course, and future treatment recommendations. Aware of how to reach Dr. Schaefer on 1S at [phone] if questions emerge.

## 2023-12-28 NOTE — BH DISCHARGE NOTE NURSING/SOCIAL WORK/PSYCH REHAB - NSBHDCADDR1FT_A_CORE
[facility]: Ambulatory Care [place] - [id number] [place] [id number]    [facility]: Ambulatory Care [place] - [phone] [place] [id number]

## 2023-12-28 NOTE — BH INPATIENT PSYCHIATRY DISCHARGE NOTE - NSBHDCRISKMITIGATEFT_PSY_ALL_CORE
referral to outpatient SWAY program at [facility] referral to outpatient SWAY program at [facility]

## 2023-12-28 NOTE — BH INPATIENT PSYCHIATRY DISCHARGE NOTE - NSBHDCMEDICALFT_PSY_A_CORE
Pt with history of microcytic anemia with home medication of iron supplementation. Pt was continued on ferrous sulfate 325mg daily, though she refused to take it.

## 2023-12-28 NOTE — BH INPATIENT PSYCHIATRY DISCHARGE NOTE - NSBHFUPINTERVALCCFT_PSY_A_CORE
"I felt uncomfortable sleeping in my room."     Discharge Progress Note Date and Time: 12-28-23 @ 11:25

## 2023-12-28 NOTE — BH INPATIENT PSYCHIATRY DISCHARGE NOTE - OTHER PAST PSYCHIATRIC HISTORY (INCLUDE DETAILS REGARDING ONSET, COURSE OF ILLNESS, INPATIENT/OUTPATIENT TREATMENT)
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 She lives with her family, mother, [phone]. Patient has no formal psychiatric history or treatment. She had a sudden change in behavior. She was not sleeping, was having an increase in goal directed activities, was speaking very quickly and doing unusual things like showing up at her aunt's house. She became religiously preoccupied and non-sensical with fluctuating mood. She does not want to be in the hospital and has been anxious and agitated. Of note, her father is, reportedly, diagnosed with Bipolar Disorder. 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 Her boyfriend provided collateral information. Patient will need referrals for outpatient providers and may be appropriate for ETP or Bipolar Clinic. She has [employer] PPO/EPPO Insurance. 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 She lives with her family, mother, [phone]. Patient has no formal psychiatric history or treatment. She had a sudden change in behavior. She was not sleeping, was having an increase in goal directed activities, was speaking very quickly and doing unusual things like showing up at her aunt's house. She became religiously preoccupied and non-sensical with fluctuating mood. She does not want to be in the hospital and has been anxious and agitated. Of note, her father is, reportedly, diagnosed with Bipolar Disorder. 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 Her boyfriend provided collateral information. Patient will need referrals for outpatient providers and may be appropriate for ETP or Bipolar Clinic. She has [employer] PPO/EPPO Insurance.

## 2023-12-28 NOTE — BH INPATIENT PSYCHIATRY DISCHARGE NOTE - NSBHMETABOLIC_PSY_ALL_CORE_FT
BMI:   HbA1c:   Glucose:   BP: --Vital Signs Last 24 Hrs  T(C): 36.5 (12-28-23 @ 06:27), Max: 36.6 (12-27-23 @ 20:24)  T(F): 97.7 (12-28-23 @ 06:27), Max: 97.8 (12-27-23 @ 20:24)  HR: --  BP: --  BP(mean): --  RR: 18 (12-28-23 @ 06:27) (18 - 18)  SpO2: --    Orthostatic VS  12-28-23 @ 06:27  Lying BP: --/-- HR: --  Sitting BP: 128/60 HR: 67  Standing BP: 112/74 HR: 80  Site: --  Mode: --  Orthostatic VS  12-27-23 @ 06:24  Lying BP: --/-- HR: --  Sitting BP: 115/63 HR: 89  Standing BP: 119/71 HR: 91  Site: --  Mode: --    Lipid Panel:

## 2023-12-28 NOTE — BH INPATIENT PSYCHIATRY DISCHARGE NOTE - NSBHASSESSSUMMFT_PSY_ALL_CORE
1. Patient to be discharged to home today with mother. Patient and family in agreement with discharge plan.  2. Continue psychiatric medications as follows:   	- aripiprazole (Abilify) 5mg by mouth daily >> . * day supply of medication provided.   3. Aftercare plan: *  4. Medical: if applicable  5. Safety plan reviewed and emergency procedures discussed including crisis clinic, ED and use of 911 for acute safety concerns. Patient and family aware of how to contact 1S team.    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    Diagnostic impression on admission is bipolar disorder vs psychosis.    Today overall, pt continues to show no signs of overt edda or positive sxs of psychosis on exam. Collateral history lends evidence to possible prodromal symptoms (isolation, behavior change) of psychosis for years leading up to admission. Currently denying overt signs of psychosis, though preoccupation with religiosity and her preoccupation that verge on perseveration are potential symptoms. No behavioral outbursts or odd/intrusive behavior in unit. She is denying SIIP/HIIP.     Patient's symptoms are on remission and she is stable for discharge.. She will follow outpatient with [facility] at [facility].     1. Patient to be discharged to home today with mother. Patient and family in agreement with discharge plan.  2. Continue psychiatric medications as follows: N/A  	- further recommendation will be made by your outpatient provider  3. Aftercare plan:   	- [facility] Intake Appointment -- Thursday 04-Jan-2024 08:45  4. Medical:  	- Continue with iron supplementation for anemia. Please follow-up with your outpatient hematologist.   5. Safety plan reviewed and emergency procedures discussed including crisis clinic, ED and use of 911 for acute safety concerns. Patient and family aware of how to contact 1S team.    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    Diagnostic impression on admission is bipolar disorder vs psychosis.    Today overall, pt continues to show no signs of overt edda or positive sxs of psychosis on exam. Collateral history lends evidence to possible prodromal symptoms (isolation, behavior change) of psychosis for years leading up to admission. Currently denying overt signs of psychosis, though preoccupation with religiosity and her preoccupation that verge on perseveration are potential symptoms. No behavioral outbursts or odd/intrusive behavior in unit. She is denying SIIP/HIIP.     Patient's symptoms are on remission and she is stable for discharge.. She will follow outpatient with [facility] at [facility].     1. Patient to be discharged to home today with mother. Patient and family in agreement with discharge plan.  2. Continue psychiatric medications as follows: N/A  	- further recommendation will be made by your outpatient provider  3. Aftercare plan:   	- [facility] Intake Appointment -- Thursday 04-Jan-2024 08:45  4. Medical:  	- Continue with iron supplementation for anemia. Please follow-up with your outpatient hematologist.   5. Safety plan reviewed and emergency procedures discussed including crisis clinic, ED and use of 911 for acute safety concerns. Patient and family aware of how to contact 1S team.    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    Diagnostic impression on admission is bipolar disorder vs psychosis.    Today overall, pt continues to show no signs of overt edda or positive sxs of psychosis on exam. Pleasant and cooperative with the interview overall. Collateral history lends evidence to possible prodromal symptoms (isolation, behavior change) of psychosis for years leading up to admission. Currently denying overt signs of psychosis, though preoccupation with religiosity and her preoccupation that verge on perseveration are potential symptoms. No behavioral outbursts or odd/intrusive behavior in unit. She is denying SIIP/HIIP.     Patient's symptoms are on remission and she is stable for discharge. She will follow outpatient with [facility] at [facility].     1. Patient to be discharged to home today with mother. Patient and family in agreement with discharge plan.  2. Continue psychiatric medications as follows: N/A  	- further recommendation will be made by your outpatient provider  3. Aftercare plan:   	- [facility] Int[facility] Appointment -- Thursday 04-Jan-2024 08:45  4. Medical:  	- Continue with iron supplementation for anemia. Please follow-up with your outpatient hematologist.   5. Safety plan reviewed and emergency procedures discussed including crisis clinic, ED and use of 911 for acute safety concerns. Patient and family aware of how to contact 1S team.    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    Diagnostic impression on admission is bipolar disorder vs psychosis.    Today overall, pt continues to show no signs of overt edad or positive sxs of psychosis on exam. Pleasant and cooperative with the interview overall. Collateral history lends evidence to possible prodromal symptoms (isolation, behavior change) of psychosis for years leading up to admission. Currently denying overt signs of psychosis, though preoccupation with religiosity and her preoccupation that verge on perseveration are potential symptoms. No behavioral outbursts or odd/intrusive behavior in unit. She is denying SIIP/HIIP.     Patient's symptoms are on remission and she is stable for discharge. She will follow outpatient with [facility] at [facility].     1. Patient to be discharged to home today with mother. Patient and family in agreement with discharge plan.  2. Continue psychiatric medications as follows: N/A  	- further recommendation will be made by your outpatient provider  3. Aftercare plan:   	- [facility] Intake Appointment -- Thursday 04-Jan-2024 08:45  4. Medical:  	- Continue with iron supplementation for anemia. Please follow-up with your outpatient hematologist.   5. Safety plan reviewed and emergency procedures discussed including crisis clinic, ED and use of 911 for acute safety concerns. Patient and family aware of how to contact 1S team.    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    Discharge dx: unspecified psychosis     Today overall, pt continues to show no signs of overt edda or positive sxs of psychosis on exam. Pleasant and cooperative with the interview overall. Collateral history lends evidence to possible prodromal symptoms (isolation, behavior change) of psychosis for years leading up to admission. Currently denying overt signs of psychosis, though preoccupation with religiosity and her preoccupation that verge on perseveration are potential symptoms. No behavioral outbursts or odd/intrusive behavior in unit. She is denying SIIP/HIIP. She is sleeping, eating, attending to ADLs. She is future oriented and willing to engage in outpatient treatment.    As per risk assessment in hospital course the patient is no longer at acutely elevated risk of harm to self or others and therefore no longer requires inpatient level of psychiatric care. She will follow outpatient with [facility].     1. Patient to be discharged to home today with mother. Patient and family in agreement with discharge plan.  2. Continue psychiatric medications as follows: patient declining abilify offered during admission  	- further recommendation will be made by your outpatient provider  3. Aftercare plan:   	- [facility] Intake Appointment -- Thursday 04-Jan-2024 08:45  4. Medical:  	- Continue with iron supplementation for anemia. Please follow-up with your outpatient hematologist.   5. Safety plan reviewed and emergency procedures discussed including crisis clinic, ED and use of 911 for acute safety concerns. Patient and family aware of how to contact 1S team.    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    Discharge dx: unspecified psychosis     Today overall, pt continues to show no signs of overt edda or positive sxs of psychosis on exam. Pleasant and cooperative with the interview overall. Collateral history lends evidence to possible prodromal symptoms (isolation, behavior change) of psychosis for years leading up to admission. Currently denying overt signs of psychosis, though preoccupation with religiosity and her preoccupation that verge on perseveration are potential symptoms. No behavioral outbursts or odd/intrusive behavior in unit. She is denying SIIP/HIIP. She is sleeping, eating, attending to ADLs. She is future oriented and willing to engage in outpatient treatment.    As per risk assessment in hospital course the patient is no longer at acutely elevated risk of harm to self or others and therefore no longer requires inpatient level of psychiatric care. She will follow outpatient with [facility] at [facility].     1. Patient to be discharged to home today with mother. Patient and family in agreement with discharge plan.  2. Continue psychiatric medications as follows: patient declining abilify offered during admission  	- further recommendation will be made by your outpatient provider  3. Aftercare plan:   	- [facility] Intake Appointment -- Thursday 04-Jan-2024 08:45  4. Medical:  	- Continue with iron supplementation for anemia. Please follow-up with your outpatient hematologist.   5. Safety plan reviewed and emergency procedures discussed including crisis clinic, ED and use of 911 for acute safety concerns. Patient and family aware of how to contact 1S team.

## 2023-12-28 NOTE — BH INPATIENT PSYCHIATRY DISCHARGE NOTE - MSE UNSTRUCTURED FT
The patient appears stated age, fair hygiene and dressed appropriately.    The patient was calm, cooperative with the interview and but overall flat relatedness. Minimal eye contact.   No psychomotor agitation or retardation noted.  Steady gait observed.    The patient's speech was fluent and normal in tone. Soft spoken but not difficult to comprehend. Not pressured.   The patient's mood is "not well".  Affect is overall dysphoric and constricted. At times tearful. Mood congruent.   Thought process is overall linear and goal directed.  Thought content focused on her discomfort with roommate. Denies any delusions or hallucinations. Denies any suicidal or homicidal ideation, intent, or plan.    Insight is poor.  Judgment is poor to fair.  Impulse control has been fair on the unit.   The patient appears stated age, fair hygiene and dressed appropriately.    The patient was calm, cooperative with the interview and but overall flat relatedness. Intermittent eye contact, improved from admission.   No psychomotor agitation or retardation noted.  Steady gait observed.    The patient's speech was fluent and normal in tone. Soft spoken but not difficult to comprehend. Not pressured.   The patient's mood is "tired but excited".  Affect is constricted but reactive, appropriate.   Thought process is overall linear and goal directed.  Thought content focused on her discomfort with roommate. Somewhat perseverative. Denies any delusions or hallucinations. Denies any suicidal or homicidal ideation, intent, or plan.    Insight is limited. Judgment is fair. Impulse control has been fair on the unit.   The patient appears stated age, fair hygiene and dressed appropriately.    The patient was calm, cooperative with the interview and but overall flat relatedness. Intermittent eye contact, improved from admission.   No psychomotor agitation or retardation noted.  Steady gait observed.    The patient's speech was fluent and normal in tone. Soft spoken but not difficult to comprehend. Not pressured.   The patient's mood is "tired but excited".  Affect is constricted but reactive, appropriate.   Thought process is overall linear and goal directed.  Thought content focused on her discomfort with roommate. Somewhat perseverative. Denies any delusions or hallucinations. Denies any suicidal or homicidal ideation, intent, or plan.  Insight is limited. Judgment is fair. Impulse control has been fair on the unit. The patient appears stated age, fair hygiene and dressed appropriately.    The patient was calm, cooperative with the interview and but overall flat relatedness. Intermittent eye contact, improved from admission.   No psychomotor agitation or retardation noted.  Steady gait observed.    The patient's speech was fluent and normal in tone. Soft spoken but not difficult to comprehend. Not pressured.   The patient's mood is "tired but excited".  Affect is neutral, constricted but reactive, appropriate.   Thought process is overall linear and goal directed.  Thought content focused on her discomfort with roommate. Somewhat perseverative. Several Caodaism themes. Denies any delusions or hallucinations. Denies any suicidal or homicidal ideation, intent, or plan.  Insight is limited. Judgment is fair. Impulse control has been fair on the unit. The patient appears stated age, fair hygiene and dressed appropriately.    The patient was calm, cooperative with the interview and but overall flat relatedness. Intermittent eye contact, improved from admission.   No psychomotor agitation or retardation noted.  Steady gait observed.    The patient's speech was fluent and normal in tone. Soft spoken but not difficult to comprehend. Not pressured.   The patient's mood is "tired but excited".  Affect is neutral, constricted but reactive, appropriate.   Thought process is overall linear and goal directed.  Thought content focused on her discomfort with roommate. Somewhat perseverative. Several Adventist themes. Denies any delusions or hallucinations. Denies any suicidal or homicidal ideation, intent, or plan.  Insight is limited. Judgment is fair. Impulse control has been fair on the unit.

## 2023-12-28 NOTE — BH INPATIENT PSYCHIATRY DISCHARGE NOTE - ATTENDING DISCHARGE PHYSICAL EXAMINATION:
see discharge progress note MSE    Patient seen today for day of discharge follow up of disorganized behavior, somatic and Islam preoccupation. Patient reported difficulty sleeping last night due to roommates agitation and intrusiveness. Pt did not feel safe in the room and as such spent night in dayroom and dining room. This AM she feels tired and is looking forward to sleeping in her own bed. Is looking forward to showering, attending to grooming and self-care, going back to the gym and seeing her boyfriend. Remains focused on Zoroastrianism and spirituality, with flat affect but increased reactivity from admission. Otherwise denying delusions, hallucinations, SI/HI or mood symptoms. Calm, in behavioral control, attending to ADLs. She is declining medications and we do not have enough to go to court for medication over objection at this time. Given this, despite concern for underlying mental illness the patient is unlikely to benefit from further admission and is not at an acutely elevated risk of harm to self or others. Therefore she can be discharged home with follow-up at [facility]. see discharge progress note MSE    Patient seen today for day of discharge follow up of disorganized behavior, somatic and Temple preoccupation. Patient reported difficulty sleeping last night due to roommates agitation and intrusiveness. Pt did not feel safe in the room and as such spent night in dayroom and dining room. This AM she feels tired and is looking forward to sleeping in her own bed. Is looking forward to showering, attending to grooming and self-care, going back to the gym and seeing her boyfriend. Remains focused on Zoroastrianism and spirituality, with flat affect but increased reactivity from admission. Otherwise denying delusions, hallucinations, SI/HI or mood symptoms. Calm, in behavioral control, attending to ADLs. She is declining medications and we do not have enough to go to court for medication over objection at this time. Given this, despite concern for underlying mental illness the patient is unlikely to benefit from further admission and is not at an acutely elevated risk of harm to self or others. Therefore she can be discharged home with follow-up at [facility].

## 2023-12-28 NOTE — BH CHART NOTE - NSEVENTNOTEFT_PSY_ALL_CORE
Called mother (Gilberto Lind): [phone]    Mother reporting her worry because pt called her this AM to report that she did not sleep at all last night. Mother expressing worry if this was going back to square one. Explained the situation with pt's current roommate which is making her feel unsafe in her room. Discussed with her that on exam today, patient did not appear on edge or activated and was her usual self that the writer has known her to be. Pt denied manic and psychotic symptoms on interview. Discussed discharge plans and outpatient follow-up. Pt will follow with [facility] after discharge; and has intake date set for next Thursday 1/4. After providing explanation to her, she feels ok with patient coming home today. SW to reach out to discuss discharge details.  Called mother (Gilberto Lind): [phone]    Mother reporting her worry because pt called her this AM to report that she did not sleep at all last night. Mother expressing worry if this was going back to square one. Explained the situation with pt's current roommate which is making her feel unsafe in her room. Discussed with her that on exam today, patient did not appear on edge or activated and was her usual self that the writer has known her to be. Pt denied manic and psychotic symptoms on interview. Discussed discharge plans and outpatient follow-up. Pt will follow with [facility] after discharge; and has intake date set for next Thursday 1/4. After providing explanation to her, she feels ok with patient coming home today. SW to reach out to discuss discharge details.

## 2023-12-28 NOTE — BH INPATIENT PSYCHIATRY DISCHARGE NOTE - NSBHFUPINTERVALHXFT_PSY_A_CORE
Chart reviewed. Case d/w interdisciplinary team. Patient seen and examined. No acute events over weekend. No PRNs required/requested. Refused standing medications--abilify and iron supplement. Per staff, patient has been more visible in milieu and interactive with other patients.     On interview today, patient reports not really getting any sleep, because of continued discomfort     feeling "not well" as she feels uncomfortable being in her room due to her roommate. Pt's roommate was admitted last Friday and had displayed some disorganized behavior that concerned the patient initially. Her roommate made pt's bed and went through her personal belongings without asking for permission. For instance, she found her toothbrush outside of a paper bag that she keeps toiletries in. The roommate does not acknowledge pt's personal space and has been continually intrusive. As a consequence, patient has been avoiding staying in her room and has been feeling anxious as she no longer has a place she feels safe and comfortable. She does not want a room/roommate change as she sees it as an escalation of the situation. She would rather not draw attention to the fact that she feels uncomfortable around her roommate.      Mood is "not well". Sleep more interrupted yesterday as she felt uneasy falling asleep last night. No issues with appetite--ate three meals yesterday. Energy level is adequate with good daily motivation. Denies flights of ideas, intrusive thoughts. Denies referential thinking, grandiose thinking, or paranoia. Denies AH/VH. Denies SIIP and HIIP. No physical complaints. Chart reviewed. Case d/w interdisciplinary team. Patient seen and examined. No acute events over weekend. No PRNs required/requested. Refused standing medications--abilify and iron supplement. Per staff, patient has been more visible in milieu and interactive with other patients.     On interview today, patient reports not really getting any sleep, because of continued discomfort with staying in her room. **** Chart reviewed. Case d/w interdisciplinary team. Patient seen and examined. No acute events over weekend. No PRNs required/requested. Refused standing medications--abilify and iron supplement. Per staff, patient has been more visible in milieu and interactive with other patients.     On interview today, patient reports not really getting much sleep, because of continued discomfort with her roommate and feeling unsafe sleeping in her room. She stayed out in the day/dining room through the night. Says that her discomfort stems from the roommate's intrusiveness and comments that feel "darker spiritual practices". Roommate accused pt of mindreading and trying to control her thoughts and has gone through patient's stuff without permission. Feels tired this morning due to not getting good sleep, but overall feeling excited about possible discharge today. Was trying to talk to mother and boyfriend about discharge planning this morning. Was able to reach her boyfriend and pt expressed her wish to discuss her spirituality further once she is out of the hospital. Pt has been reflecting on her time in the hospital and feels that there was a reason why she ended up here. Adds that she took this time to be consistent about her belief and have time to herself. Does not anticipate difficulties after discharge. Excited to go shopping, get her hair done, and spend time with her boyfriend.    Mood "tired but excited". Sleep poor last night and appropriately tired. Good appetite this morning. Denies manic symptoms -- flights of ideas, distractibility, impulsivity, goal-directed behaviors, disorganized thoughts. Denies SIIP/HIIP.  Chart reviewed. Case d/w interdisciplinary team. Patient seen and examined. No acute events overnight. No PRNs required/requested. Refused standing medications--abilify and iron supplement. Per staff, patient has been more visible in milieu and interactive with other patients. Spent night in dayroom and dining room with minimal sleep.     On interview today, patient reports not really getting much sleep, because of continued discomfort with her roommate and feeling unsafe sleeping in her room. She stayed out in the day/dining room through the night. Says that her discomfort stems from the roommate's intrusiveness and comments that feel like "darker spiritual practices". Roommate accused pt of mindreading and trying to control her thoughts and has gone through patient's stuff without permission. Feels tired this morning due to not getting good sleep, but overall feeling excited about possible discharge today. Was trying to talk to mother and boyfriend about discharge planning this morning. Was able to reach her boyfriend and pt expressed her wish to discuss her spirituality further once she is out of the hospital. Pt has been reflecting on her time in the hospital and feels that there was a reason why she ended up here. Adds that she took this time to be consistent about her belief and have time to herself. Does not anticipate difficulties after discharge. Excited to go shopping, get her hair done, and spend time with her boyfriend.    Mood "tired but excited". Sleep poor last night and appropriately tired. Good appetite this morning. Denies manic symptoms -- flights of ideas, distractibility, impulsivity, goal-directed behaviors, disorganized thoughts. Denies grandiosity, paranoia, ideas of reference, TW/TI, mind reading. Denies SIIP/HIIP.

## 2023-12-28 NOTE — BH INPATIENT PSYCHIATRY DISCHARGE NOTE - HOSPITAL COURSE
Dates of hospitalization: 12/18/2023 - 12/28/2023    On admission interview, patient presented with the following signs and symptoms:  - In ED: rapid speech and disorganized thought process  - elevated mood, scattered sleep, decreased appetite, increased interest in Yazidi  - MSE: poor eye contact, constricted affect    On admission interview, patient denied experiencing symptoms and behaviors that were concerning to her parents and boyfirned. On exam, she does not display symptoms of edda--no pressured speech, goal-directed activities, impulsivity, or irritability. Only endorsing increased Pentecostalism belief that is not baseline for her. Per collateral provided by mother, patient's lack of eye contact, isolation, and hyperreligiosity are all out of norm for patient.     Patient was recommended aripiprazole 5mg to address symptoms of edda and disorganized behavior she exhibited prior to admission. However, patient continued to refuse medication daily. She also refused iron supplementation for anemia. Despite lack of pharmacologic treatment, patient showed no disorganized behavior or endorsed thought process disorder. Patient’s symptoms gradually improved over the course of the hospitalization. At time of discharge, patient continues to deny symptoms of edda including elevated mood, pressured speech, distractibility, irritability, disorganized thought process, and delusions. Only symptom remaining is hyperreligiosity and penchant for isolation and poor eye contact. Though her admitting diagnosis was edda, there is growing concern that her remaining symptoms represent prodromal symptoms of primary psychotic disorder.     There were no behavioral problems on the unit. Patient did not become agitated and did not require emergent intramuscular medications or seclusion/restraints. Patient did not self-harm on the unit. Patient remained actively engaged in treatment. Patient minimally  participated in group and milieu therapy. Patient got along appropriately with staff and peers. Family was contacted at time of admission to obtain collateral, provide psychoeducation, and collaboratively treatment plan. Patient did not have any medical problems during this hospitalization. There were no medical consultations.    On day of discharge, the patient has improved significantly and no longer requires inpatient treatment and care. Patient denies all suicidal and aggressive ideation, intent and plan. Patient displays no psychotic symptoms. Patient is not judged to be an acute danger to self or others at this time.    Risk Assessment:   Chronic risk factor of harm to self and others: diagnosis of possible bipolar disorder or primary psychiatric disorder.    Protective factors include: young, healthy, denies SI/I/P, no history of suicide attempts, no history of NSSIB, identifies reasons for living, Pentecostalism opposition to suicide, future oriented    On admission the patient was felt to be at an acutely elevated risk of harm to self/others as they displayed signs and symptoms of edda, including disorganized behaviors.     Over the hospital course patient did not display disorganized behavior or thought. Continued to deny symptoms of edda. Was not noted to be impulsive, distractible, or irritable. Her sleep, appetite, and concentration improved. On day of discharge, patient denying suicidal ideation, intent, or plan.     Given the above, the patient is no longer felt to be at an acutely elevated risk of harm to self/others and therefore no longer requires inpatient hospitalization. She is stable for transition to outpatient level of care.    Patient will be discharged with the following DSM5 diagnoses:  1. edda  2. prodromal symptoms of primary psychotic disorder    Patient will be discharged on the following medications:  1. n/a (patient was recommended abilify 5mg during the hospitalization) Dates of hospitalization: 12/18/2023 - 12/28/2023    On admission interview, patient presented with the following signs and symptoms:  - In ED: rapid speech and disorganized thought process  - elevated mood, scattered sleep, decreased appetite, increased interest in Confucianism  - MSE: poor eye contact, constricted affect    On admission interview, patient denied experiencing symptoms and behaviors that were concerning to her parents and boyfirned. On exam, she does not display symptoms of edda--no pressured speech, goal-directed activities, impulsivity, or irritability. Only endorsing increased Jewish belief that is not baseline for her. Per collateral provided by mother, patient's lack of eye contact, isolation, and hyperreligiosity are all out of norm for patient.     Patient was recommended aripiprazole 5mg to address symptoms of edda and disorganized behavior she exhibited prior to admission. However, patient continued to refuse medication daily. She also refused iron supplementation for anemia. Despite lack of pharmacologic treatment, patient showed no disorganized behavior or endorsed thought process disorder. Patient’s symptoms gradually improved over the course of the hospitalization. At time of discharge, patient continues to deny symptoms of edda including elevated mood, pressured speech, distractibility, irritability, disorganized thought process, and delusions. Only symptom remaining is hyperreligiosity and penchant for isolation and poor eye contact. Though her admitting diagnosis was edda, there is growing concern that her remaining symptoms represent prodromal symptoms of primary psychotic disorder.     There were no behavioral problems on the unit. Patient did not become agitated and did not require emergent intramuscular medications or seclusion/restraints. Patient did not self-harm on the unit. Patient remained actively engaged in treatment. Patient minimally  participated in group and milieu therapy. Patient got along appropriately with staff and peers. Family was contacted at time of admission to obtain collateral, provide psychoeducation, and collaboratively treatment plan. Patient did not have any medical problems during this hospitalization. There were no medical consultations.    On day of discharge, the patient has improved significantly and no longer requires inpatient treatment and care. Patient denies all suicidal and aggressive ideation, intent and plan. Patient displays no psychotic symptoms. Patient is not judged to be an acute danger to self or others at this time.    Risk Assessment:   Chronic risk factor of harm to self and others: diagnosis of possible bipolar disorder or primary psychiatric disorder.    Protective factors include: young, healthy, denies SI/I/P, no history of suicide attempts, no history of NSSIB, identifies reasons for living, Jewish opposition to suicide, future oriented    On admission the patient was felt to be at an acutely elevated risk of harm to self/others as they displayed signs and symptoms of edda, including disorganized behaviors.     Over the hospital course patient did not display disorganized behavior or thought. Continued to deny symptoms of edda. Was not noted to be impulsive, distractible, or irritable. Her sleep, appetite, and concentration improved. On day of discharge, patient denying suicidal ideation, intent, or plan.     Given the above, the patient is no longer felt to be at an acutely elevated risk of harm to self/others and therefore no longer requires inpatient hospitalization. She is stable for transition to outpatient level of care.    Patient will be discharged with the following DSM5 diagnoses:  1. edda  2. prodromal symptoms of primary psychotic disorder    Patient will be discharged on the following medications:  1. n/a (patient was recommended abilify 5mg during the hospitalization) Dates of hospitalization: 12/18/2023 - 12/28/2023    On admission interview, patient presented with the following signs and symptoms:  - In ED: rapid speech and disorganized thought process  - elevated mood, scattered sleep, decreased appetite, increased interest in Hinduism  - MSE: poor eye contact, constricted affect    On 1S patient denied experiencing symptoms and behaviors that were concerning to her parents and boyfirned. On exam, she does not display symptoms of edda--no pressured speech, goal-directed activities, impulsivity, or irritability. Only endorsing increased Methodist belief that is not baseline for her. Per collateral provided by mother, patient's lack of eye contact, isolation, and hyperreligiosity are all out of norm for patient as well as somatic preoccupation for months and decreased PO intake. Mom also expressed concern regarding a months to years long isolation, decreased attention to organization in her room, difficulty holding a job,    On the unit the patient was isolative, often in her room or in a corner praying and reading the bible. She participated in some groups as her stay progressed and ate meals with peers.     Patient was recommended aripiprazole 5mg to address symptoms she exhibited prior to admission. However, patient continued to refuse medication daily. She also refused iron supplementation for anemia as she wanted to focus on dietary sources of iron. Despite lack of pharmacologic treatment, patient showed no disorganized behavior or thought process. At time of discharge, patient continues to deny and does not evidence symptoms of edda including elevated mood, pressured speech, distractibility, irritability, disorganized thought process, increased goal directed activity, grandiosity.  She has consistently denied delusions, hallucinations, SI/HI. Only symptom remaining is hyperreligiosity and penchant for isolation and poor eye contact. there is growing concern that her remaining symptoms represent prodromal symptoms of primary psychotic disorder, with possible for schizoaffective d/o bipolar type as well.     Of note the treatment team felt strongly that the patient would benefit from medications. However, given behavioral control, minimal demonstrated symptoms (Methodist focus, isolation, flat affect), the team felt that there was not enough evidence to support an application for treatment over objection at this time. This was discussed with Mom who expressed concern but understanding of the limitations of court mandated treatment.    There were no behavioral problems on the unit. Patient did not become agitated and did not require emergent intramuscular medications or seclusion/restraints. Patient did not self-harm on the unit. Patient remained actively engaged in treatment. Patient minimally  participated in group and milieu therapy. Patient got along appropriately with staff and peers. Family was contacted at time of admission to obtain collateral, provide psychoeducation, and collaboratively treatment plan. Family was contacted on discharge to discuss discharge plan and safety planning. Patient did not have any medical problems during this hospitalization. There were no medical consultations.    On day of discharge, the patient has improved and no longer requires inpatient treatment and care. Patient denies all suicidal and aggressive ideation, intent and plan. Patient displays no acute psychotic or manic symptoms. Patient is not judged to be an acute danger to self or others at this time.    Risk Assessment:   Chronic risk factor of harm to self and others: diagnosis of possible bipolar disorder or primary psychiatric disorder, family history of bipolar disorder.    Protective factors include: young, healthy, denies SI/I/P, no history of suicide attempts, no history of NSSIB, identifies reasons for living, Methodist opposition to suicide, future oriented, no history of substance abuse, no h/o violence    On admission the patient was felt to be at an acutely elevated risk of harm to self/others as they presented with disorganized behavior and thought process, somatic preoccupation, decreased sleep, increase in Methodist thinking and poor self care.     Over the hospital course patient did not display disorganized behavior or thought. Continued to deny symptoms of edda and psychosis. Was not noted to be impulsive, distractible, or irritable. Her sleep, appetite, and concentration improved. She consistently denied delusions, hallucinations, SI/HI. She often remained focused on Hinduism which is a change for her, but not to the point of interfering with self-care. On day of discharge, patient denying suicidal and homicidal ideation, intent, or plan.     Given the above, the patient is no longer felt to be at an acutely elevated risk of harm to self/others and therefore no longer requires inpatient hospitalization. She is stable for transition to outpatient level of care.    Patient will be discharged with the following DSM5 diagnoses:  unspecified psychosis    Patient will be discharged on the following medications:  1. n/a (patient was recommended abilify 5mg during the hospitalization but consistently declined) Dates of hospitalization: 12/18/2023 - 12/28/2023    On admission interview, patient presented with the following signs and symptoms:  - In ED: rapid speech and disorganized thought process  - elevated mood, scattered sleep, decreased appetite, increased interest in Pentecostal  - MSE: poor eye contact, constricted affect    On 1S patient denied experiencing symptoms and behaviors that were concerning to her parents and boyfirned. On exam, she does not display symptoms of edda--no pressured speech, goal-directed activities, impulsivity, or irritability. Only endorsing increased Yarsani belief that is not baseline for her. Per collateral provided by mother, patient's lack of eye contact, isolation, and hyperreligiosity are all out of norm for patient as well as somatic preoccupation for months and decreased PO intake. Mom also expressed concern regarding a months to years long isolation, decreased attention to organization in her room, difficulty holding a job,    On the unit the patient was isolative, often in her room or in a corner praying and reading the bible. She participated in some groups as her stay progressed and ate meals with peers.     Patient was recommended aripiprazole 5mg to address symptoms she exhibited prior to admission. However, patient continued to refuse medication daily. She also refused iron supplementation for anemia as she wanted to focus on dietary sources of iron. Despite lack of pharmacologic treatment, patient showed no disorganized behavior or thought process. At time of discharge, patient continues to deny and does not evidence symptoms of edda including elevated mood, pressured speech, distractibility, irritability, disorganized thought process, increased goal directed activity, grandiosity.  She has consistently denied delusions, hallucinations, SI/HI. Only symptom remaining is hyperreligiosity and penchant for isolation and poor eye contact. there is growing concern that her remaining symptoms represent prodromal symptoms of primary psychotic disorder, with possible for schizoaffective d/o bipolar type as well.     Of note the treatment team felt strongly that the patient would benefit from medications. However, given behavioral control, minimal demonstrated symptoms (Yarsani focus, isolation, flat affect), the team felt that there was not enough evidence to support an application for treatment over objection at this time. This was discussed with Mom who expressed concern but understanding of the limitations of court mandated treatment.    There were no behavioral problems on the unit. Patient did not become agitated and did not require emergent intramuscular medications or seclusion/restraints. Patient did not self-harm on the unit. Patient remained actively engaged in treatment. Patient minimally  participated in group and milieu therapy. Patient got along appropriately with staff and peers. Family was contacted at time of admission to obtain collateral, provide psychoeducation, and collaboratively treatment plan. Family was contacted on discharge to discuss discharge plan and safety planning. Patient did not have any medical problems during this hospitalization. There were no medical consultations.    On day of discharge, the patient has improved and no longer requires inpatient treatment and care. Patient denies all suicidal and aggressive ideation, intent and plan. Patient displays no acute psychotic or manic symptoms. Patient is not judged to be an acute danger to self or others at this time.    Risk Assessment:   Chronic risk factor of harm to self and others: diagnosis of possible bipolar disorder or primary psychiatric disorder, family history of bipolar disorder.    Protective factors include: young, healthy, denies SI/I/P, no history of suicide attempts, no history of NSSIB, identifies reasons for living, Yarsani opposition to suicide, future oriented, no history of substance abuse, no h/o violence    On admission the patient was felt to be at an acutely elevated risk of harm to self/others as they presented with disorganized behavior and thought process, somatic preoccupation, decreased sleep, increase in Yarsani thinking and poor self care.     Over the hospital course patient did not display disorganized behavior or thought. Continued to deny symptoms of edda and psychosis. Was not noted to be impulsive, distractible, or irritable. Her sleep, appetite, and concentration improved. She consistently denied delusions, hallucinations, SI/HI. She often remained focused on Pentecostal which is a change for her, but not to the point of interfering with self-care. On day of discharge, patient denying suicidal and homicidal ideation, intent, or plan.     Given the above, the patient is no longer felt to be at an acutely elevated risk of harm to self/others and therefore no longer requires inpatient hospitalization. She is stable for transition to outpatient level of care.    Patient will be discharged with the following DSM5 diagnoses:  unspecified psychosis    Patient will be discharged on the following medications:  1. n/a (patient was recommended abilify 5mg during the hospitalization but consistently declined)

## 2023-12-28 NOTE — BH DISCHARGE NOTE NURSING/SOCIAL WORK/PSYCH REHAB - NSDCPRGOAL_PSY_ALL_CORE
During the current hospitalization, patient has been addressing psychiatric rehabilitation goals pertaining to identifying coping skills that assist in organizing. Patient has demonstrated progress towards psychiatric rehabilitation goals during the current hospitalization. Patient exhibited progress through participating in individual and group therapy and developing additional coping skills to assist with managing negative emotions such as praying and reading the Bible. Writer encouraged patient to continue to strengthen and practice effective skills. Patient was receptive. Patient attended approximately 40 percent of psychiatric rehabilitation groups. Patient met goal of identifying coping skills by reporting these skills have helped her during current hospitalization. Patient reports she will continue to practice coping skills. Patient reports overall improvement in mood. Patient reports feeling "good" about going home. Patient reports she is looking forward to seeing her family. Writer and patient engaged in safety planning. Patient was compliant with medications during current hospitalization. Patient was visible on the unit and was appropriate with peers and staff. Patient was provided with a Press Ganey survey prior to discharge.

## 2023-12-28 NOTE — BH INPATIENT PSYCHIATRY DISCHARGE NOTE - REASON FOR ADMISSION
24yo female patient, who has no past psychiatric history or inpatient admission, no previous psych meds or treatment, with past medical history of anemia and fibroid, brought in by EMS for insomnia and disorganized behaviors.   22yo female patient, who has no past psychiatric history or inpatient admission, no previous psych meds or treatment, with past medical history of anemia and fibroid, brought in by EMS for insomnia and disorganized behaviors.

## 2023-12-28 NOTE — BH DISCHARGE NOTE NURSING/SOCIAL WORK/PSYCH REHAB - [facility]
Unit Name: 1 [address]                       Unit Phone Number: [phone] Unit Name: 1 South                       Unit Phone Number: [phone]

## 2023-12-28 NOTE — BH DISCHARGE NOTE NURSING/SOCIAL WORK/PSYCH REHAB - PATIENT PORTAL LINK FT
You can access the FollowMyHealth Patient Portal offered by [facility] by registering at the following website: [email or web address]. By joining [employer]’s FollowMyHealth portal, you will also be able to view your health information using other applications (apps) compatible with our system. You can access the FollowMyHealth Patient Portal offered by [facility] by registering at the following website: [email or web address]. By joining [facility] [employer]’s FollowMyHealth portal, you will also be able to view your health information using other applications (apps) compatible with our system.

## 2023-12-28 NOTE — BH INPATIENT PSYCHIATRY DISCHARGE NOTE - NSBHDCBILLING_PSY_ALL_CORE
[id number] (Hospital discharge day management; more than 30 min) [id number] (Hospital discharge day management; more than 30 min)

## 2023-12-28 NOTE — BH INPATIENT PSYCHIATRY DISCHARGE NOTE - NSBHDCRISKMITIGATE_PSY_ALL_CORE
Family/Other social support involvement Safety planning/Family/Other social support involvement/Other

## 2023-12-28 NOTE — BH DISCHARGE NOTE NURSING/SOCIAL WORK/PSYCH REHAB - NSCDUDCCRISIS_PSY_A_CORE
[facility]  1 (843) [facility]-WELL (749-9917)  Text "WELL" to 64080  Website: www.[employer]/.  [facility] - [facility] – Crisis Care for Children, Adults and Families  [address] [place] [address]  Mobile Crisis Hotline – (964) 381-4865/.National Suicide Prevention Lifeline 4 (133) 395-4622/.  [facility]  1 (013) LIFENET (297-3623)/.  [facility] Behavioral Health Helpline / [facility] Mobile Crisis  (575) 125-MVKR (2641)/.  [facility] Response Crisis Hotline  (329) 232-1164  24 hour telephone crisis intervention and suicide prevention hotline concerned with all mental health issues/.  [facility]  [address] [place] [address] [phone]) [phone]   Hours:  Monday through Friday from 9 AM to 3 PM/988 Suicide and Crisis Lifeline [facility]  1 (153) [facility]-WELL (637-1867)  Text "WELL" to 16602  Website: www.[employer]/.  [facility] - [facility] – Crisis Care for Children, Adults and Families  [address] [place] [address]  Mobile Crisis Hotline – (523) 132-9577/.National Suicide Prevention Lifeline 9 (536) 158-5178/.  [facility]  1 (403) LIFENET (218-4970)/.  [facility] Behavioral Health Helpline / [facility] Mobile Crisis  (890) 863-MNWC (5117)/.  [facility] Response Crisis Hotline  (744) 398-8813  24 hour telephone crisis intervention and suicide prevention hotline concerned with all mental health issues/.  [facility] Behavioral [facility] Crisis [facility]  [address] [place] [address] [phone]   Hours:  Monday through Friday from 9 AM to 3 PM/988 Suicide and Crisis Lifeline

## 2023-12-28 NOTE — BH INPATIENT PSYCHIATRY DISCHARGE NOTE - NSDCCPCAREPLAN_GEN_ALL_CORE_FT
PRINCIPAL DISCHARGE DIAGNOSIS  Diagnosis: Mirtha  Assessment and Plan of Treatment: You were admitted for symptoms concerning for mirtha -- lack of sleep, impulsive and goal-directed actions, and disorganized behaviors. Aripiprazole (Abilify) was recommended during your hospitalization to help with these symptoms. Further medication recommendation will be managed by your outpatient provider based on your need.      SECONDARY DISCHARGE DIAGNOSES  Diagnosis: Psychosis  Assessment and Plan of Treatment: Some of the symptoms that you are experiencing may be an early signs of psychiatric illness that cause psychosis. As such, we feel that you will benefit from being connected to outpatient mental health providers who can make sure that no other concerning symptoms arise.

## 2023-12-28 NOTE — BH DISCHARGE NOTE NURSING/SOCIAL WORK/PSYCH REHAB - NSDCPRRECOMMEND_PSY_ALL_CORE
Psychiatric rehabilitation staff recommends patient will benefit from attending [facility] for medication management, support, and psychotherapy

## 2024-01-04 ENCOUNTER — OUTPATIENT (OUTPATIENT)
Dept: OUTPATIENT SERVICES | Facility: HOSPITAL | Age: 24
LOS: 1 days | Discharge: HOME | End: 2024-01-04
Payer: COMMERCIAL

## 2024-01-04 PROCEDURE — 90792 PSYCH DIAG EVAL W/MED SRVCS: CPT

## 2024-01-08 DIAGNOSIS — F28 OTHER PSYCHOTIC DISORDER NOT DUE TO A SUBSTANCE OR KNOWN PHYSIOLOGICAL CONDITION: ICD-10-CM

## 2024-01-18 PROCEDURE — 90834 PSYTX W PT 45 MINUTES: CPT

## 2024-01-25 PROCEDURE — 90837 PSYTX W PT 60 MINUTES: CPT

## 2024-02-01 PROCEDURE — 90834 PSYTX W PT 45 MINUTES: CPT

## 2024-02-01 PROCEDURE — 99214 OFFICE O/P EST MOD 30 MIN: CPT | Mod: 95

## 2024-02-01 PROCEDURE — 90833 PSYTX W PT W E/M 30 MIN: CPT | Mod: 95

## 2024-02-06 PROCEDURE — 90834 PSYTX W PT 45 MINUTES: CPT | Mod: 93

## 2024-02-13 PROCEDURE — 90834 PSYTX W PT 45 MINUTES: CPT | Mod: 93

## 2024-02-20 PROCEDURE — 90834 PSYTX W PT 45 MINUTES: CPT | Mod: 95

## 2024-02-27 PROCEDURE — 90834 PSYTX W PT 45 MINUTES: CPT | Mod: 93

## 2024-03-12 PROCEDURE — 90834 PSYTX W PT 45 MINUTES: CPT | Mod: 95

## 2024-03-19 PROCEDURE — 90834 PSYTX W PT 45 MINUTES: CPT | Mod: 95

## 2024-03-28 PROCEDURE — 90834 PSYTX W PT 45 MINUTES: CPT | Mod: 95

## 2024-04-01 PROCEDURE — 99214 OFFICE O/P EST MOD 30 MIN: CPT | Mod: 95

## 2024-04-04 PROCEDURE — 90834 PSYTX W PT 45 MINUTES: CPT | Mod: 93

## 2024-04-11 PROCEDURE — 90834 PSYTX W PT 45 MINUTES: CPT | Mod: 93

## 2024-04-23 PROCEDURE — 90834 PSYTX W PT 45 MINUTES: CPT | Mod: 95

## 2024-04-25 NOTE — BH PSYCHOLOGY - GROUP THERAPY NOTE - TOKEN PULL-DIAGNOSIS
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      
Patient/Father
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

## 2024-05-09 PROCEDURE — 90834 PSYTX W PT 45 MINUTES: CPT | Mod: 93

## 2024-05-16 PROCEDURE — 90834 PSYTX W PT 45 MINUTES: CPT | Mod: 95,GT

## 2024-05-21 PROCEDURE — 90834 PSYTX W PT 45 MINUTES: CPT | Mod: 95,GT

## 2024-05-28 PROCEDURE — 90834 PSYTX W PT 45 MINUTES: CPT | Mod: 95,GT

## 2024-06-04 PROCEDURE — 90834 PSYTX W PT 45 MINUTES: CPT | Mod: 93

## 2024-06-17 PROCEDURE — 99214 OFFICE O/P EST MOD 30 MIN: CPT | Mod: 95

## 2024-06-17 PROCEDURE — 90833 PSYTX W PT W E/M 30 MIN: CPT | Mod: 95

## 2024-06-18 PROCEDURE — 90834 PSYTX W PT 45 MINUTES: CPT | Mod: 93

## 2024-07-11 PROCEDURE — 90834 PSYTX W PT 45 MINUTES: CPT | Mod: 93

## 2024-07-25 PROCEDURE — 90834 PSYTX W PT 45 MINUTES: CPT | Mod: 95

## 2024-08-06 PROCEDURE — 90834 PSYTX W PT 45 MINUTES: CPT | Mod: 93

## 2024-08-19 NOTE — DIETITIAN INITIAL EVALUATION ADULT - FACTORS AFF FOOD INTAKE
Called pt on 8/19/24 at 11am LV to schedule AWV appt.   Gnosticism/ethnic/cultural/personal food preferences Ep[employer]opalian/ethnic/cultural/personal food preferences

## 2024-08-20 PROCEDURE — 90834 PSYTX W PT 45 MINUTES: CPT | Mod: 95

## 2024-09-05 PROCEDURE — 90834 PSYTX W PT 45 MINUTES: CPT | Mod: 93

## 2025-04-07 NOTE — ED BEHAVIORAL HEALTH ASSESSMENT NOTE - NSBHMSEHYG_PSY_A_CORE
Client participated in recreational therapy from 4:00 to 5:30 to explore sober activities with peers specifically engaging in Basketball.    Fair